# Patient Record
Sex: MALE | Race: WHITE | NOT HISPANIC OR LATINO | Employment: FULL TIME | URBAN - METROPOLITAN AREA
[De-identification: names, ages, dates, MRNs, and addresses within clinical notes are randomized per-mention and may not be internally consistent; named-entity substitution may affect disease eponyms.]

---

## 2020-01-04 ENCOUNTER — ANESTHESIA (OUTPATIENT)
Dept: ENDOSCOPY | Facility: HOSPITAL | Age: 50
DRG: 378 | End: 2020-01-04

## 2020-01-04 ENCOUNTER — HOSPITAL ENCOUNTER (INPATIENT)
Facility: HOSPITAL | Age: 50
LOS: 2 days | Discharge: LEFT BEFORE DISCHARGE | DRG: 378 | End: 2020-01-06
Attending: HOSPITALIST | Admitting: HOSPITALIST

## 2020-01-04 ENCOUNTER — ANESTHESIA EVENT (OUTPATIENT)
Dept: ENDOSCOPY | Facility: HOSPITAL | Age: 50
DRG: 378 | End: 2020-01-04

## 2020-01-04 ENCOUNTER — HOSPITAL ENCOUNTER (EMERGENCY)
Facility: HOSPITAL | Age: 50
Discharge: ANOTHER HOSPITAL | End: 2020-01-04
Attending: FAMILY MEDICINE

## 2020-01-04 VITALS
TEMPERATURE: 98 F | SYSTOLIC BLOOD PRESSURE: 136 MMHG | HEART RATE: 61 BPM | HEIGHT: 72 IN | RESPIRATION RATE: 12 BRPM | DIASTOLIC BLOOD PRESSURE: 78 MMHG | WEIGHT: 205 LBS | OXYGEN SATURATION: 99 % | BODY MASS INDEX: 27.77 KG/M2

## 2020-01-04 DIAGNOSIS — K74.60 HEPATIC CIRRHOSIS, UNSPECIFIED HEPATIC CIRRHOSIS TYPE, UNSPECIFIED WHETHER ASCITES PRESENT: ICD-10-CM

## 2020-01-04 DIAGNOSIS — K92.2 UPPER GI BLEED: Primary | ICD-10-CM

## 2020-01-04 DIAGNOSIS — K74.60 CIRRHOSIS OF LIVER WITH ASCITES, UNSPECIFIED HEPATIC CIRRHOSIS TYPE: ICD-10-CM

## 2020-01-04 DIAGNOSIS — K92.0 HEMATEMESIS WITH NAUSEA: Primary | ICD-10-CM

## 2020-01-04 DIAGNOSIS — R18.8 CIRRHOSIS OF LIVER WITH ASCITES, UNSPECIFIED HEPATIC CIRRHOSIS TYPE: ICD-10-CM

## 2020-01-04 DIAGNOSIS — K92.2 GIB (GASTROINTESTINAL BLEEDING): ICD-10-CM

## 2020-01-04 DIAGNOSIS — K92.1 MELENA: ICD-10-CM

## 2020-01-04 DIAGNOSIS — K70.31 ASCITES DUE TO ALCOHOLIC CIRRHOSIS: ICD-10-CM

## 2020-01-04 PROBLEM — I10 ESSENTIAL HYPERTENSION: Status: ACTIVE | Noted: 2020-01-04

## 2020-01-04 PROBLEM — K40.90 NON-RECURRENT UNILATERAL INGUINAL HERNIA WITHOUT OBSTRUCTION OR GANGRENE: Status: ACTIVE | Noted: 2020-01-04

## 2020-01-04 LAB
ABO + RH BLD: NORMAL
ABO + RH BLD: NORMAL
ALBUMIN SERPL BCP-MCNC: 2.7 G/DL (ref 3.5–5.2)
ALP SERPL-CCNC: 115 U/L (ref 55–135)
ALT SERPL W/O P-5'-P-CCNC: 14 U/L (ref 10–44)
AMMONIA PLAS-SCNC: 48 UMOL/L (ref 10–50)
ANION GAP SERPL CALC-SCNC: 8 MMOL/L (ref 8–16)
AST SERPL-CCNC: 26 U/L (ref 10–40)
BASOPHILS # BLD AUTO: 0.05 K/UL (ref 0–0.2)
BASOPHILS # BLD AUTO: 0.06 K/UL (ref 0–0.2)
BASOPHILS # BLD AUTO: 0.06 K/UL (ref 0–0.2)
BASOPHILS # BLD AUTO: 0.07 K/UL (ref 0–0.2)
BASOPHILS NFR BLD: 0.9 % (ref 0–1.9)
BASOPHILS NFR BLD: 1 % (ref 0–1.9)
BASOPHILS NFR BLD: 1.1 % (ref 0–1.9)
BASOPHILS NFR BLD: 1.1 % (ref 0–1.9)
BILIRUB SERPL-MCNC: 0.9 MG/DL (ref 0.1–1)
BLD GP AB SCN CELLS X3 SERPL QL: NORMAL
BLD GP AB SCN CELLS X3 SERPL QL: NORMAL
BLD PROD TYP BPU: NORMAL
BLD PROD TYP BPU: NORMAL
BLOOD UNIT EXPIRATION DATE: NORMAL
BLOOD UNIT EXPIRATION DATE: NORMAL
BLOOD UNIT TYPE CODE: 6200
BLOOD UNIT TYPE CODE: 6200
BLOOD UNIT TYPE: NORMAL
BLOOD UNIT TYPE: NORMAL
BUN SERPL-MCNC: 39 MG/DL (ref 6–20)
CALCIUM SERPL-MCNC: 8.6 MG/DL (ref 8.7–10.5)
CHLORIDE SERPL-SCNC: 112 MMOL/L (ref 95–110)
CO2 SERPL-SCNC: 18 MMOL/L (ref 23–29)
CODING SYSTEM: NORMAL
CODING SYSTEM: NORMAL
CREAT SERPL-MCNC: 2.4 MG/DL (ref 0.5–1.4)
DIFFERENTIAL METHOD: ABNORMAL
DISPENSE STATUS: NORMAL
DISPENSE STATUS: NORMAL
EOSINOPHIL # BLD AUTO: 0.2 K/UL (ref 0–0.5)
EOSINOPHIL # BLD AUTO: 0.3 K/UL (ref 0–0.5)
EOSINOPHIL # BLD AUTO: 0.4 K/UL (ref 0–0.5)
EOSINOPHIL # BLD AUTO: 0.6 K/UL (ref 0–0.5)
EOSINOPHIL NFR BLD: 2.8 % (ref 0–8)
EOSINOPHIL NFR BLD: 4.6 % (ref 0–8)
EOSINOPHIL NFR BLD: 6.6 % (ref 0–8)
EOSINOPHIL NFR BLD: 9.1 % (ref 0–8)
ERYTHROCYTE [DISTWIDTH] IN BLOOD BY AUTOMATED COUNT: 14 % (ref 11.5–14.5)
ERYTHROCYTE [DISTWIDTH] IN BLOOD BY AUTOMATED COUNT: 14.3 % (ref 11.5–14.5)
ERYTHROCYTE [DISTWIDTH] IN BLOOD BY AUTOMATED COUNT: 15 % (ref 11.5–14.5)
ERYTHROCYTE [DISTWIDTH] IN BLOOD BY AUTOMATED COUNT: 15.2 % (ref 11.5–14.5)
EST. GFR  (AFRICAN AMERICAN): 35.3 ML/MIN/1.73 M^2
EST. GFR  (NON AFRICAN AMERICAN): 30.5 ML/MIN/1.73 M^2
ETHANOL SERPL-MCNC: 9 MG/DL
FERRITIN SERPL-MCNC: 97 NG/ML (ref 20–300)
GLUCOSE SERPL-MCNC: 115 MG/DL (ref 70–110)
HCT VFR BLD AUTO: 21.1 % (ref 40–54)
HCT VFR BLD AUTO: 22.9 % (ref 40–54)
HCT VFR BLD AUTO: 24.7 % (ref 40–54)
HCT VFR BLD AUTO: 25.2 % (ref 40–54)
HGB BLD-MCNC: 7.1 G/DL (ref 14–18)
HGB BLD-MCNC: 7.8 G/DL (ref 14–18)
HGB BLD-MCNC: 8.2 G/DL (ref 14–18)
HGB BLD-MCNC: 8.5 G/DL (ref 14–18)
IMM GRANULOCYTES # BLD AUTO: 0.01 K/UL (ref 0–0.04)
IMM GRANULOCYTES # BLD AUTO: 0.02 K/UL (ref 0–0.04)
IMM GRANULOCYTES NFR BLD AUTO: 0.2 % (ref 0–0.5)
IMM GRANULOCYTES NFR BLD AUTO: 0.4 % (ref 0–0.5)
INR PPP: 1.2 (ref 0.8–1.2)
IRON SERPL-MCNC: 119 UG/DL (ref 45–160)
LIPASE SERPL-CCNC: 53 U/L (ref 4–60)
LYMPHOCYTES # BLD AUTO: 0.9 K/UL (ref 1–4.8)
LYMPHOCYTES # BLD AUTO: 1.3 K/UL (ref 1–4.8)
LYMPHOCYTES # BLD AUTO: 1.4 K/UL (ref 1–4.8)
LYMPHOCYTES # BLD AUTO: 1.6 K/UL (ref 1–4.8)
LYMPHOCYTES NFR BLD: 15.9 % (ref 18–48)
LYMPHOCYTES NFR BLD: 20.4 % (ref 18–48)
LYMPHOCYTES NFR BLD: 24.2 % (ref 18–48)
LYMPHOCYTES NFR BLD: 26.5 % (ref 18–48)
MCH RBC QN AUTO: 32.7 PG (ref 27–31)
MCH RBC QN AUTO: 33.2 PG (ref 27–31)
MCH RBC QN AUTO: 33.6 PG (ref 27–31)
MCH RBC QN AUTO: 33.6 PG (ref 27–31)
MCHC RBC AUTO-ENTMCNC: 33.2 G/DL (ref 32–36)
MCHC RBC AUTO-ENTMCNC: 33.6 G/DL (ref 32–36)
MCHC RBC AUTO-ENTMCNC: 33.7 G/DL (ref 32–36)
MCHC RBC AUTO-ENTMCNC: 34.1 G/DL (ref 32–36)
MCV RBC AUTO: 100 FL (ref 82–98)
MCV RBC AUTO: 98 FL (ref 82–98)
MCV RBC AUTO: 98 FL (ref 82–98)
MCV RBC AUTO: 99 FL (ref 82–98)
MONOCYTES # BLD AUTO: 0.3 K/UL (ref 0.3–1)
MONOCYTES # BLD AUTO: 0.5 K/UL (ref 0.3–1)
MONOCYTES # BLD AUTO: 0.5 K/UL (ref 0.3–1)
MONOCYTES # BLD AUTO: 0.6 K/UL (ref 0.3–1)
MONOCYTES NFR BLD: 5.9 % (ref 4–15)
MONOCYTES NFR BLD: 7.3 % (ref 4–15)
MONOCYTES NFR BLD: 8.6 % (ref 4–15)
MONOCYTES NFR BLD: 9.8 % (ref 4–15)
NEUTROPHILS # BLD AUTO: 3.4 K/UL (ref 1.8–7.7)
NEUTROPHILS # BLD AUTO: 3.5 K/UL (ref 1.8–7.7)
NEUTROPHILS # BLD AUTO: 3.9 K/UL (ref 1.8–7.7)
NEUTROPHILS # BLD AUTO: 4 K/UL (ref 1.8–7.7)
NEUTROPHILS NFR BLD: 55.9 % (ref 38–73)
NEUTROPHILS NFR BLD: 61.5 % (ref 38–73)
NEUTROPHILS NFR BLD: 61.9 % (ref 38–73)
NEUTROPHILS NFR BLD: 73.9 % (ref 38–73)
NRBC BLD-RTO: 0 /100 WBC
NUM UNITS TRANS PACKED RBC: NORMAL
NUM UNITS TRANS PACKED RBC: NORMAL
OB PNL STL: POSITIVE
PLATELET # BLD AUTO: 143 K/UL (ref 150–350)
PLATELET # BLD AUTO: 145 K/UL (ref 150–350)
PLATELET # BLD AUTO: 146 K/UL (ref 150–350)
PLATELET # BLD AUTO: 169 K/UL (ref 150–350)
PMV BLD AUTO: 9.1 FL (ref 9.2–12.9)
PMV BLD AUTO: 9.3 FL (ref 9.2–12.9)
PMV BLD AUTO: 9.4 FL (ref 9.2–12.9)
PMV BLD AUTO: 9.4 FL (ref 9.2–12.9)
POTASSIUM SERPL-SCNC: 4.4 MMOL/L (ref 3.5–5.1)
PROT SERPL-MCNC: 6.5 G/DL (ref 6–8.4)
PROTHROMBIN TIME: 13.3 SEC (ref 9–12.5)
RBC # BLD AUTO: 2.11 M/UL (ref 4.6–6.2)
RBC # BLD AUTO: 2.32 M/UL (ref 4.6–6.2)
RBC # BLD AUTO: 2.51 M/UL (ref 4.6–6.2)
RBC # BLD AUTO: 2.56 M/UL (ref 4.6–6.2)
SATURATED IRON: 49 % (ref 20–50)
SODIUM SERPL-SCNC: 138 MMOL/L (ref 136–145)
TOTAL IRON BINDING CAPACITY: 244 UG/DL (ref 250–450)
TRANSFERRIN SERPL-MCNC: 165 MG/DL (ref 200–375)
WBC # BLD AUTO: 5.41 K/UL (ref 3.9–12.7)
WBC # BLD AUTO: 5.61 K/UL (ref 3.9–12.7)
WBC # BLD AUTO: 6.03 K/UL (ref 3.9–12.7)
WBC # BLD AUTO: 6.26 K/UL (ref 3.9–12.7)

## 2020-01-04 PROCEDURE — 63600175 PHARM REV CODE 636 W HCPCS: Performed by: INTERNAL MEDICINE

## 2020-01-04 PROCEDURE — 36430 TRANSFUSION BLD/BLD COMPNT: CPT

## 2020-01-04 PROCEDURE — 82140 ASSAY OF AMMONIA: CPT

## 2020-01-04 PROCEDURE — 83540 ASSAY OF IRON: CPT

## 2020-01-04 PROCEDURE — 63600175 PHARM REV CODE 636 W HCPCS: Performed by: NURSE PRACTITIONER

## 2020-01-04 PROCEDURE — C9113 INJ PANTOPRAZOLE SODIUM, VIA: HCPCS | Performed by: FAMILY MEDICINE

## 2020-01-04 PROCEDURE — 43255 EGD CONTROL BLEEDING ANY: CPT | Mod: ,,, | Performed by: INTERNAL MEDICINE

## 2020-01-04 PROCEDURE — 99254 PR INITIAL INPATIENT CONSULT,LEVL IV: ICD-10-PCS | Mod: 25,,, | Performed by: INTERNAL MEDICINE

## 2020-01-04 PROCEDURE — 86920 COMPATIBILITY TEST SPIN: CPT

## 2020-01-04 PROCEDURE — 11000001 HC ACUTE MED/SURG PRIVATE ROOM

## 2020-01-04 PROCEDURE — 70450 CT HEAD WITHOUT CONTRAST: ICD-10-PCS | Mod: 26,,, | Performed by: RADIOLOGY

## 2020-01-04 PROCEDURE — 85025 COMPLETE CBC W/AUTO DIFF WBC: CPT | Mod: 91

## 2020-01-04 PROCEDURE — 96374 THER/PROPH/DIAG INJ IV PUSH: CPT

## 2020-01-04 PROCEDURE — 63600175 PHARM REV CODE 636 W HCPCS: Performed by: NURSE ANESTHETIST, CERTIFIED REGISTERED

## 2020-01-04 PROCEDURE — 70450 CT HEAD/BRAIN W/O DYE: CPT | Mod: 26,,, | Performed by: RADIOLOGY

## 2020-01-04 PROCEDURE — 96375 TX/PRO/DX INJ NEW DRUG ADDON: CPT | Mod: 59

## 2020-01-04 PROCEDURE — 99254 IP/OBS CNSLTJ NEW/EST MOD 60: CPT | Mod: 25,,, | Performed by: INTERNAL MEDICINE

## 2020-01-04 PROCEDURE — 86850 RBC ANTIBODY SCREEN: CPT | Mod: 91

## 2020-01-04 PROCEDURE — 37000008 HC ANESTHESIA 1ST 15 MINUTES: Performed by: INTERNAL MEDICINE

## 2020-01-04 PROCEDURE — 82728 ASSAY OF FERRITIN: CPT

## 2020-01-04 PROCEDURE — 82105 ALPHA-FETOPROTEIN SERUM: CPT

## 2020-01-04 PROCEDURE — 27201038 HC PROBE, BI-POLAR: Performed by: INTERNAL MEDICINE

## 2020-01-04 PROCEDURE — D9220A PRA ANESTHESIA: Mod: ,,, | Performed by: ANESTHESIOLOGY

## 2020-01-04 PROCEDURE — 96361 HYDRATE IV INFUSION ADD-ON: CPT

## 2020-01-04 PROCEDURE — 80053 COMPREHEN METABOLIC PANEL: CPT

## 2020-01-04 PROCEDURE — 70450 CT HEAD/BRAIN W/O DYE: CPT | Mod: TC

## 2020-01-04 PROCEDURE — 36415 COLL VENOUS BLD VENIPUNCTURE: CPT

## 2020-01-04 PROCEDURE — C9113 INJ PANTOPRAZOLE SODIUM, VIA: HCPCS | Performed by: NURSE PRACTITIONER

## 2020-01-04 PROCEDURE — 25000003 PHARM REV CODE 250: Performed by: NURSE PRACTITIONER

## 2020-01-04 PROCEDURE — D9220A PRA ANESTHESIA: ICD-10-PCS | Mod: ,,, | Performed by: ANESTHESIOLOGY

## 2020-01-04 PROCEDURE — 99285 EMERGENCY DEPT VISIT HI MDM: CPT | Mod: 25

## 2020-01-04 PROCEDURE — 43236 UPPR GI SCOPE W/SUBMUC INJ: CPT | Performed by: INTERNAL MEDICINE

## 2020-01-04 PROCEDURE — 80320 DRUG SCREEN QUANTALCOHOLS: CPT

## 2020-01-04 PROCEDURE — 43255 EGD CONTROL BLEEDING ANY: CPT | Performed by: INTERNAL MEDICINE

## 2020-01-04 PROCEDURE — 37000009 HC ANESTHESIA EA ADD 15 MINS: Performed by: INTERNAL MEDICINE

## 2020-01-04 PROCEDURE — 82272 OCCULT BLD FECES 1-3 TESTS: CPT

## 2020-01-04 PROCEDURE — 83690 ASSAY OF LIPASE: CPT

## 2020-01-04 PROCEDURE — 86850 RBC ANTIBODY SCREEN: CPT

## 2020-01-04 PROCEDURE — P9016 RBC LEUKOCYTES REDUCED: HCPCS

## 2020-01-04 PROCEDURE — 43255 PR EGD, FLEX, W/CTRL BLEED, ANY METHOD: ICD-10-PCS | Mod: ,,, | Performed by: INTERNAL MEDICINE

## 2020-01-04 PROCEDURE — 63600175 PHARM REV CODE 636 W HCPCS: Performed by: FAMILY MEDICINE

## 2020-01-04 PROCEDURE — 25000003 PHARM REV CODE 250: Performed by: INTERNAL MEDICINE

## 2020-01-04 PROCEDURE — 85610 PROTHROMBIN TIME: CPT

## 2020-01-04 RX ORDER — PROPOFOL 10 MG/ML
VIAL (ML) INTRAVENOUS
Status: DISCONTINUED | OUTPATIENT
Start: 2020-01-04 | End: 2020-01-04

## 2020-01-04 RX ORDER — LIDOCAINE HCL/PF 100 MG/5ML
SYRINGE (ML) INTRAVENOUS
Status: DISCONTINUED | OUTPATIENT
Start: 2020-01-04 | End: 2020-01-04

## 2020-01-04 RX ORDER — ONDANSETRON 2 MG/ML
4 INJECTION INTRAMUSCULAR; INTRAVENOUS
Status: COMPLETED | OUTPATIENT
Start: 2020-01-04 | End: 2020-01-04

## 2020-01-04 RX ORDER — SODIUM CHLORIDE 9 MG/ML
INJECTION, SOLUTION INTRAVENOUS CONTINUOUS
Status: DISCONTINUED | OUTPATIENT
Start: 2020-01-04 | End: 2020-01-04

## 2020-01-04 RX ORDER — PANTOPRAZOLE SODIUM 40 MG/10ML
40 INJECTION, POWDER, LYOPHILIZED, FOR SOLUTION INTRAVENOUS
Status: COMPLETED | OUTPATIENT
Start: 2020-01-04 | End: 2020-01-04

## 2020-01-04 RX ORDER — HYDROCODONE BITARTRATE AND ACETAMINOPHEN 500; 5 MG/1; MG/1
TABLET ORAL
Status: DISCONTINUED | OUTPATIENT
Start: 2020-01-04 | End: 2020-01-04 | Stop reason: HOSPADM

## 2020-01-04 RX ORDER — HYDRALAZINE HYDROCHLORIDE 20 MG/ML
20 INJECTION INTRAMUSCULAR; INTRAVENOUS EVERY 6 HOURS PRN
Status: DISCONTINUED | OUTPATIENT
Start: 2020-01-04 | End: 2020-01-06 | Stop reason: HOSPADM

## 2020-01-04 RX ORDER — SODIUM CHLORIDE 9 MG/ML
1000 INJECTION, SOLUTION INTRAVENOUS
Status: COMPLETED | OUTPATIENT
Start: 2020-01-04 | End: 2020-01-04

## 2020-01-04 RX ORDER — MORPHINE SULFATE 4 MG/ML
2 INJECTION, SOLUTION INTRAMUSCULAR; INTRAVENOUS
Status: COMPLETED | OUTPATIENT
Start: 2020-01-04 | End: 2020-01-04

## 2020-01-04 RX ORDER — SUCRALFATE 1 G/10ML
1 SUSPENSION ORAL EVERY 6 HOURS
Status: DISCONTINUED | OUTPATIENT
Start: 2020-01-04 | End: 2020-01-06 | Stop reason: HOSPADM

## 2020-01-04 RX ORDER — SODIUM CHLORIDE 9 MG/ML
INJECTION, SOLUTION INTRAVENOUS CONTINUOUS
Status: ACTIVE | OUTPATIENT
Start: 2020-01-04 | End: 2020-01-05

## 2020-01-04 RX ADMIN — PROPOFOL 50 MG: 10 INJECTION, EMULSION INTRAVENOUS at 11:01

## 2020-01-04 RX ADMIN — SODIUM CHLORIDE 1000 ML: 0.9 INJECTION, SOLUTION INTRAVENOUS at 01:01

## 2020-01-04 RX ADMIN — SODIUM CHLORIDE 1000 ML: 0.9 INJECTION, SOLUTION INTRAVENOUS at 11:01

## 2020-01-04 RX ADMIN — PANTOPRAZOLE SODIUM 40 MG: 40 INJECTION, POWDER, LYOPHILIZED, FOR SOLUTION INTRAVENOUS at 01:01

## 2020-01-04 RX ADMIN — DEXTROSE 8 MG/HR: 50 INJECTION, SOLUTION INTRAVENOUS at 04:01

## 2020-01-04 RX ADMIN — CEFTRIAXONE 1 G: 1 INJECTION, SOLUTION INTRAVENOUS at 12:01

## 2020-01-04 RX ADMIN — ONDANSETRON 4 MG: 2 INJECTION INTRAMUSCULAR; INTRAVENOUS at 03:01

## 2020-01-04 RX ADMIN — DEXTROSE 8 MG/HR: 50 INJECTION, SOLUTION INTRAVENOUS at 02:01

## 2020-01-04 RX ADMIN — PROPOFOL 100 MG: 10 INJECTION, EMULSION INTRAVENOUS at 11:01

## 2020-01-04 RX ADMIN — SODIUM CHLORIDE 1000 ML: 0.9 INJECTION, SOLUTION INTRAVENOUS at 12:01

## 2020-01-04 RX ADMIN — DEXTROSE 8 MG/HR: 50 INJECTION, SOLUTION INTRAVENOUS at 08:01

## 2020-01-04 RX ADMIN — SUCRALFATE 1 G: 1 SUSPENSION ORAL at 05:01

## 2020-01-04 RX ADMIN — DEXTROSE 8 MG/HR: 50 INJECTION, SOLUTION INTRAVENOUS at 12:01

## 2020-01-04 RX ADMIN — LIDOCAINE HYDROCHLORIDE 100 MG: 20 INJECTION, SOLUTION INTRAVENOUS at 11:01

## 2020-01-04 RX ADMIN — MORPHINE SULFATE 2 MG: 4 INJECTION, SOLUTION INTRAMUSCULAR; INTRAVENOUS at 03:01

## 2020-01-04 RX ADMIN — FOLIC ACID: 5 INJECTION, SOLUTION INTRAMUSCULAR; INTRAVENOUS; SUBCUTANEOUS at 01:01

## 2020-01-04 RX ADMIN — SODIUM CHLORIDE: 0.9 INJECTION, SOLUTION INTRAVENOUS at 05:01

## 2020-01-04 NOTE — PROGRESS NOTES
EGD done.  Nonbleeding varices with no stigmata of bleeding noted.  Severe portal hypertensive gastropathy with likely chronic oozing noted, however he also has multiple duodenal and gastric ulcers.  One of the duodenal ulcers was oozing and required endoscopic therapy.  Recommend IV PPI and oral sucralfate liquid.  Recommend repeat EGD in 8 weeks to check healing and may consider initiation of therapeutic banding at that time.  Check ultrasound and AFP.  Advance diet.  Will follow.

## 2020-01-04 NOTE — ASSESSMENT & PLAN NOTE
In the setting of chronic alcohol use and NSAID use   Abdominal ultrasound   No signs of hepatic encephalopathy on exam. Not complaint with medications at home.   Abdomen has fluid but not tense. Will consider paracentesis if inhouse on Monday. Ultrasound shows moderate ascites so not sure if enough fluid to tap.

## 2020-01-04 NOTE — H&P
[facility] Medicine  History & Physical    Patient Name: Gumaro Caruso  MRN: [id number]  Admission Date: 1/4/2020  Attending Physician: Bentley Palma MD   Primary Care Provider: Primary Doctor No         Patient information was obtained from patient and review of medical records.     Subjective:     Principal Problem:<principal problem not specified>    Chief Complaint: No chief complaint on file.       HPI: 49 y.o. male with  a h/o liver cirrhosis. Chronic NSAID use, chronic heavy alchol use, quit last year as per him who presented to an OSH for  hematemesis, onset yesterday, occurring x 4-5 episodes, associated with abdominal pain, nausea and black stool over the same period, with no alleviating/exacerbating factors.    He states that he has [place] of [place] in the past and had an ulcer. He has been a heavy drinker for many years and states that he quit in October.  He has a known history of cirrhosis secondary to EtOH.   He also has been endorsing worsening swelling in his abdomen in the last week. Is suppose to be on medications , about 6 of them but does not take any of the medications he is prescribed due to insurance reasons.   Patient was transferred over for urgent evaluation of GI bleed and underwent an EGD which showed Nonbleeding varices with no stigmata of bleeding noted.  Severe portal hypertensive gastropathy with likely chronic oozing noted, however he also has multiple duodenal and gastric ulcers.  One of the duodenal ulcers was oozing and required endoscopic therapy.   Post procedure he was continued on IV PPI and admitted to the hosopitalist service     Past Medical History:   Diagnosis Date    Cirrhosis     Pancreatitis        Past Surgical History:   Procedure Laterality Date    ANKLE SURGERY      ELBOW SURGERY      FINGER SURGERY      HAND SURGERY      KNEE SURGERY      SHOULDER SURGERY         Review of patient's allergies indicates:  No Known  Allergies    Current Facility-Administered Medications on File Prior to Encounter   Medication    [COMPLETED] 0.9%  NaCl infusion    [COMPLETED] morphine injection 2 mg    [COMPLETED] ondansetron injection 4 mg    [COMPLETED] pantoprazole injection 40 mg    [DISCONTINUED] 0.9%  NaCl infusion (for blood administration)     No current outpatient medications on file prior to encounter.     Family History     None        Tobacco Use    Smoking status: Former Smoker   Substance and Sexual Activity    Alcohol use: Yes     Comment: daily    Drug use: Not Currently    Sexual activity: Not on file     Review of Systems   Constitutional: Positive for activity change. Negative for appetite change, chills, diaphoresis and fatigue.   HENT: Negative.    Eyes: Negative.    Respiratory: Negative.    Cardiovascular: Negative.    Gastrointestinal: Positive for abdominal distention, abdominal pain, blood in stool and nausea. Negative for constipation, diarrhea and rectal pain.        Hematemesis   Endocrine: Negative.    Genitourinary: Negative.    Musculoskeletal: Negative.    Skin: Negative.    Allergic/Immunologic: Negative.    Neurological: Negative.    Hematological: Negative.    Psychiatric/Behavioral: Negative.      Objective:     Vital Signs (Most Recent):  Temp: 98.4 °F (36.9 °C) (01/04/20 1452)  Pulse: 71 (01/04/20 1452)  Resp: 18 (01/04/20 1452)  BP: (!) 162/97 (01/04/20 1452)  SpO2: 98 % (01/04/20 1452) Vital Signs (24h Range):  Temp:  [97.6 °F (36.4 °C)-98.4 °F (36.9 °C)] 98.4 °F (36.9 °C)  Pulse:  [61-90] 71  Resp:  [11-20] 18  SpO2:  [94 %-100 %] 98 %  BP: (123-229)/() 162/97     Weight: 93 kg (205 lb)  Body mass index is 27.8 kg/m².    Physical Exam   Constitutional: He is oriented to person, place, and time. He appears well-developed.   HENT:   Head: Normocephalic.   Eyes: Pupils are equal, round, and reactive to light.   Neck: Normal range of motion.   Cardiovascular: Normal rate and regular rhythm.    Pulmonary/Chest: Effort normal and breath sounds normal.   Abdominal: He exhibits distension. He exhibits no mass. There is no tenderness. There is no rebound and no guarding. A hernia is present.   Musculoskeletal: Normal range of motion.   Neurological: He is alert and oriented to person, place, and time. He displays normal reflexes. No cranial nerve deficit. He exhibits normal muscle tone. Coordination normal.   Skin: Skin is warm and dry. Capillary refill takes less than 2 seconds.         CRANIAL NERVES     CN III, IV, VI   Pupils are equal, round, and reactive to light.       Significant Labs: All pertinent labs within the past 24 hours have been reviewed.    Significant Imaging: I have reviewed all pertinent imaging results/findings within the past 24 hours.    Assessment/Plan:     Essential hypertension  Prn hydralazine to maintain BP < 160/100      Alcoholic cirrhosis of liver with ascites  In the setting of chronic alcohol use and NSAID use   Abdominal ultrasound   No signs of hepatic encephalopathy on exam. Not complaint with medications at home.   Abdomen has fluid but not tense. Will consider paracentesis if inhouse on Monday. Ultrasound shows moderate ascites so not sure if enough fluid to tap.       Non-recurrent unilateral inguinal hernia without obstruction or gangrene  Surgery consulted. Appreciate recs      GIB (gastrointestinal bleeding)  S/p urgent EGD which showed Nonbleeding varices with no stigmata of bleeding noted.  Severe portal hypertensive gastropathy with likely chronic oozing noted, however he also has multiple duodenal and gastric ulcers.  One of the duodenal ulcers was oozing and required endoscopic therapy.      GI following patient. Appreciate recs  Continue protonix drip  Sucralfate added per recs.   H&H monitoring with transfusion as needed   Advance diet  2 large bore IV access   Abdominal ultrasound and AFP per GI      VTE Risk Mitigation (From admission, onward)          Ordered     IP VTE LOW RISK PATIENT  Once      01/04/20 1016     Place sequential compression device  Until discontinued      01/04/20 1016                   Bentley Palma MD  Department of Hospital Medicine   [facility]

## 2020-01-04 NOTE — HPI
Patient is a 49-year-old male who was at work on Tuesday 12/31 and kicked a piece of wood. He felt a pop in the right groin.  He finished working but later that night when he was in the shower he noticed a lump in the right groin.  Over the next several days the lump became larger and larger and began to extend into his scrotum.  He complains of tenderness.  At the end of the workday it would be very swollen but by morning it would go down significantly.  He has been tolerating a regular diet.  He denies any abdominal pain.  Last night he had an episode of vomiting blood as well as a black melanotic stool and went to the emergency room. He was transferred here for EGD.  He underwent EGD this morning with endoscopic intervention of gastric and duodenal ulcers.

## 2020-01-04 NOTE — CONSULTS
[facility]  General Surgery  Consult Note    Patient Name: Gumaro Caruso  MRN: [id number]  Code Status: Full Code  Admission Date: 1/4/2020  Hospital Length of Stay: 0 days  Attending Physician: Bentley Palma MD  Primary Care Provider: Primary Doctor No    Patient information was obtained from patient and ER records.     Consults  Subjective:     Principal Problem: <principal problem not specified>    History of Present Illness: Patient is a 49-year-old male who was at work on Tuesday 12/31 and kicked a piece of wood. He felt a pop in the right groin.  He finished working but later that night when he was in the shower he noticed a lump in the right groin.  Over the next several days the lump became larger and larger and began to extend into his scrotum.  He complains of tenderness.  At the end of the workday it would be very swollen but by morning it would go down significantly.  He has been tolerating a regular diet.  He denies any abdominal pain.  Last night he had an episode of vomiting blood as well as a black melanotic stool and went to the emergency room. He was transferred here for EGD.  He underwent EGD this morning with endoscopic intervention of gastric and duodenal ulcers.    Current Facility-Administered Medications on File Prior to Encounter   Medication    [COMPLETED] 0.9%  NaCl infusion    [COMPLETED] morphine injection 2 mg    [COMPLETED] ondansetron injection 4 mg    [COMPLETED] pantoprazole injection 40 mg    [DISCONTINUED] 0.9%  NaCl infusion (for blood administration)     No current outpatient medications on file prior to encounter.       Review of patient's allergies indicates:  No Known Allergies    Past Medical History:   Diagnosis Date    Cirrhosis     Pancreatitis      Past Surgical History:   Procedure Laterality Date    ANKLE SURGERY      ELBOW SURGERY      FINGER SURGERY      HAND SURGERY      KNEE SURGERY      SHOULDER SURGERY       Family History      None        Tobacco Use    Smoking status: Former Smoker   Substance and Sexual Activity    Alcohol use: Yes     Comment: daily    Drug use: Not Currently    Sexual activity: Not on file     Review of Systems   Constitutional: Negative for appetite change, chills, fever and unexpected weight change.   HENT: Negative for hearing loss, rhinorrhea, sore throat and voice change.    Eyes: Negative for photophobia and visual disturbance.   Respiratory: Negative for cough, choking and shortness of breath.    Cardiovascular: Negative for chest pain, palpitations and leg swelling.   Gastrointestinal: Positive for blood in stool (melena) and vomiting (blood). Negative for abdominal pain, constipation, diarrhea and nausea.   Endocrine: Negative for cold intolerance, heat intolerance, polydipsia and polyuria.   Genitourinary: Positive for scrotal swelling and testicular pain.   Musculoskeletal: Negative for arthralgias, back pain, joint swelling and neck stiffness.   Skin: Negative for color change, pallor and rash.   Neurological: Negative for dizziness, seizures, syncope and headaches.   Hematological: Negative for adenopathy. Does not bruise/bleed easily.   Psychiatric/Behavioral: Negative for agitation, behavioral problems and confusion.     Objective:     Vital Signs (Most Recent):  Temp: 98.4 °F (36.9 °C) (01/04/20 1452)  Pulse: 71 (01/04/20 1452)  Resp: 18 (01/04/20 1452)  BP: (!) 162/97 (01/04/20 1452)  SpO2: 98 % (01/04/20 1452) Vital Signs (24h Range):  Temp:  [97.6 °F (36.4 °C)-98.4 °F (36.9 °C)] 98.4 °F (36.9 °C)  Pulse:  [61-90] 71  Resp:  [11-20] 18  SpO2:  [94 %-100 %] 98 %  BP: (123-229)/() 162/97     Weight: 93 kg (205 lb)  Body mass index is 27.8 kg/m².    Physical Exam   Constitutional: He appears well-developed and well-nourished.  Non-toxic appearance. No distress.   HENT:   Head: Normocephalic and atraumatic. Head is without abrasion and without laceration.   Right Ear: External ear normal.    Left Ear: External ear normal.   Nose: Nose normal.   Mouth/Throat: Oropharynx is clear and moist.   Eyes: Pupils are equal, round, and reactive to light. EOM are normal.   Neck: Trachea normal. No tracheal deviation and normal range of motion present. No thyroid mass and no thyromegaly present.   Cardiovascular: Normal rate and regular rhythm.   Pulmonary/Chest: Effort normal. No accessory muscle usage. No tachypnea. No respiratory distress.   Abdominal: Soft. Normal appearance and bowel sounds are normal. He exhibits no distension and no mass. There is no hepatosplenomegaly. There is no tenderness. There is no tenderness at McBurney's point and negative Baumann's sign. A hernia is present. Hernia confirmed positive in the right inguinal area.   Genitourinary:   Genitourinary Comments: Very large but completely soft and reducible RIH extending into scrotum. No skin changes.   Lymphadenopathy:     He has no cervical adenopathy.     He has no axillary adenopathy.        Right: No inguinal adenopathy present.        Left: No inguinal adenopathy present.   Neurological: He is alert. Coordination and gait normal.   Skin: Skin is warm and intact.   Psychiatric: He has a normal mood and affect. His speech is normal and behavior is normal.       Significant Labs:  CBC:   Recent Labs   Lab 01/04/20  1420   WBC 5.61   RBC 2.56*   HGB 8.5*   HCT 25.2*   *   MCV 98   MCH 33.2*   MCHC 33.7     BMP:   Recent Labs   Lab 01/04/20  0137   *      K 4.4   *   CO2 18*   BUN 39*   CREATININE 2.4*   CALCIUM 8.6*         Assessment/Plan:     Non-recurrent unilateral inguinal hernia without obstruction or gangrene  No evidence of incarceration or strangulation  No need for emergent surgery  Recommend OP surgical eval to have repair ASAP - should be covered by workers comp    GIB (gastrointestinal bleeding)  S/p EGD  Cont medical mgmt      VTE Risk Mitigation (From admission, onward)         Ordered     IP VTE  LOW RISK PATIENT  Once      01/04/20 1016     Place sequential compression device  Until discontinued      01/04/20 1016                Thank you for your consult. I will sign off. Please contact us if you have any additional questions.    Elsa Weston MD  General Surgery  [facility]

## 2020-01-04 NOTE — [employer] PATIENT INSTRUCTIONS
Discharge Summary/Instructions after an Endoscopic Procedure  Patient Name: Gumaro Caruso  Patient MRN: [id number]  Patient YOB: 1970 Saturday, January 04, 2020  Gigi Lloyd MD  RESTRICTIONS:  During your procedure today, you received medications for sedation.  These   medications may affect your judgment, balance and coordination.  Therefore,   for 24 hours, you have the following restrictions:   - DO NOT drive a car, operate machinery, make legal/financial decisions,   sign important papers or drink alcohol.    ACTIVITY:  Today: no heavy lifting, straining or running due to procedural   sedation/anesthesia.  The following day: return to full activity including work.  DIET:  Eat and drink normally unless instructed otherwise.     TREATMENT FOR COMMON SIDE EFFECTS:  - Mild abdominal pain, nausea, belching, bloating or excessive gas:  rest,   eat lightly and use a heating pad.  - Sore Throat: treat with throat lozenges and/or gargle with warm salt   water.  - Because air was used during the procedure, expelling large amounts of air   from your rectum or belching is normal.  - If a bowel prep was taken, you may not have a bowel movement for 1-3 days.    This is normal.  SYMPTOMS TO WATCH FOR AND REPORT TO YOUR PHYSICIAN:  1. Abdominal pain or bloating, other than gas cramps.  2. Chest pain.  3. Back pain.  4. Signs of infection such as: chills or fever occurring within 24 hours   after the procedure.  5. Rectal bleeding, which would show as bright red, maroon, or black stools.   (A tablespoon of blood from the rectum is not serious, especially if   hemorrhoids are present.)  6. Vomiting.  7. Weakness or dizziness.  GO DIRECTLY TO THE NEAREST EMERGENCY ROOM IF YOU HAVE ANY OF THE FOLLOWING:      Difficulty breathing              Chills and/or fever over 101 F   Persistent vomiting and/or vomiting blood   Severe abdominal pain   Severe chest pain   Black, tarry stools   Bleeding- more than one  tablespoon   Any other symptom or condition that you feel may need urgent attention  Your doctor recommends these additional instructions:  If any biopsies were taken, your doctors clinic will contact you in 1 to 2   weeks with any results.  - Return patient to hospital [facility] for ongoing care.   - Advance diet as tolerated and clear liquid diet.   - Continue present medications.   - No aspirin, ibuprofen, naproxen, or other non-steroidal anti-inflammatory   drugs.   - Repeat upper endoscopy in 8 weeks to check healing.   - Follow an antireflux regimen.   - Give Protonix (pantoprazole): initiate therapy with 80 mg IV bolus, then 8   mg/hr IV by continuous infusion for 3 days.   - Use sucralfate suspension 1 gram PO QID for 10 days.  For questions, problems or results please call your physician - Gigi Lloyd MD at Work:  [phone].  [facility], EMERGENCY ROOM PHONE NUMBER: [phone]  IF A COMPLICATION OR EMERGENCY SITUATION ARISES AND YOU ARE UNABLE TO REACH   YOUR PHYSICIAN - GO DIRECTLY TO THE EMERGENCY ROOM.  Gigi Lloyd MD  1/4/2020 11:42:43 AM  This report has been verified and signed electronically.  [employer]

## 2020-01-04 NOTE — PLAN OF CARE
POC reviewed with patient. Patient verbalizes understanding. Safety and tele maintained throughout shift. Free from falls. No complaints of pain. Call light within reach.

## 2020-01-04 NOTE — SUBJECTIVE & OBJECTIVE
Past Medical History:   Diagnosis Date    Cirrhosis     Pancreatitis        Past Surgical History:   Procedure Laterality Date    ANKLE SURGERY      ELBOW SURGERY      FINGER SURGERY      HAND SURGERY      KNEE SURGERY      SHOULDER SURGERY         Review of patient's allergies indicates:  No Known Allergies    Current Facility-Administered Medications on File Prior to Encounter   Medication    [COMPLETED] 0.9%  NaCl infusion    [COMPLETED] morphine injection 2 mg    [COMPLETED] ondansetron injection 4 mg    [COMPLETED] pantoprazole injection 40 mg    [DISCONTINUED] 0.9%  NaCl infusion (for blood administration)     No current outpatient medications on file prior to encounter.     Family History     None        Tobacco Use    Smoking status: Former Smoker   Substance and Sexual Activity    Alcohol use: Yes     Comment: daily    Drug use: Not Currently    Sexual activity: Not on file     Review of Systems   Constitutional: Positive for activity change. Negative for appetite change, chills, diaphoresis and fatigue.   HENT: Negative.    Eyes: Negative.    Respiratory: Negative.    Cardiovascular: Negative.    Gastrointestinal: Positive for abdominal distention, abdominal pain, blood in stool and nausea. Negative for constipation, diarrhea and rectal pain.        Hematemesis   Endocrine: Negative.    Genitourinary: Negative.    Musculoskeletal: Negative.    Skin: Negative.    Allergic/Immunologic: Negative.    Neurological: Negative.    Hematological: Negative.    Psychiatric/Behavioral: Negative.      Objective:     Vital Signs (Most Recent):  Temp: 98.4 °F (36.9 °C) (01/04/20 1452)  Pulse: 71 (01/04/20 1452)  Resp: 18 (01/04/20 1452)  BP: (!) 162/97 (01/04/20 1452)  SpO2: 98 % (01/04/20 1452) Vital Signs (24h Range):  Temp:  [97.6 °F (36.4 °C)-98.4 °F (36.9 °C)] 98.4 °F (36.9 °C)  Pulse:  [61-90] 71  Resp:  [11-20] 18  SpO2:  [94 %-100 %] 98 %  BP: (123-229)/() 162/97     Weight: 93 kg (205  lb)  Body mass index is 27.8 kg/m².    Physical Exam   Constitutional: He is oriented to person, place, and time. He appears well-developed.   HENT:   Head: Normocephalic.   Eyes: Pupils are equal, round, and reactive to light.   Neck: Normal range of motion.   Cardiovascular: Normal rate and regular rhythm.   Pulmonary/Chest: Effort normal and breath sounds normal.   Abdominal: He exhibits distension. He exhibits no mass. There is no tenderness. There is no rebound and no guarding. A hernia is present.   Musculoskeletal: Normal range of motion.   Neurological: He is alert and oriented to person, place, and time. He displays normal reflexes. No cranial nerve deficit. He exhibits normal muscle tone. Coordination normal.   Skin: Skin is warm and dry. Capillary refill takes less than 2 seconds.         CRANIAL NERVES     CN III, IV, VI   Pupils are equal, round, and reactive to light.       Significant Labs: All pertinent labs within the past 24 hours have been reviewed.    Significant Imaging: I have reviewed all pertinent imaging results/findings within the past 24 hours.

## 2020-01-04 NOTE — ED NOTES
VERBAL CONSENT RECEIVED FROM PATIENT TO UPDATE BROTHER YANELI [phone] ON STATUS. AUGS HUANG RN

## 2020-01-04 NOTE — PLAN OF CARE
(Physician in Lead of Transfers)   Outside Transfer Acceptance Note / Regional Referral Center      Transferring Physician: Federico Carson MD    Accepting Physician: Jean-Claude Pineda MD    Date of Acceptance: 01/04/2020    Transferring Facility: [facility]     Reason for Transfer: GI bleed    Report from Transferring Physician/Hospital course:  Patient is a 49 y.o. male who has a past medical history of Cirrhosis and Pancreatitis presented with vomiting bright red blood. Patient has history of alcohol use and was following hepatologist in [place]. Rectal exam guaiac positive. Hemoglobin initially was 7.8 but decreased to 7.1 after IVF's. Patient is preparing to get 2 units of PRBC transfusion prior to transfer. Hemodynamically stable. No coagulopathy. Not on anti platelets or anticoagulation. Received CT head because patient complained of headache which did not show any acute intracranial abnormalities. Patient being transferred because facility does not have GI services.     Vital Signs (Most Recent):  Temp: 97.9 °F (36.6 °C) (01/04/20 0118)  Pulse: 76 (01/04/20 0347)  Resp: 14 (01/04/20 0347)  BP: (!) 174/102 (01/04/20 0347)  SpO2: 97 % (01/04/20 0347) Vital Signs (24h Range):  Temp:  [97.9 °F (36.6 °C)] 97.9 °F (36.6 °C)  Pulse:  [73-90] 76  Resp:  [12-18] 14  SpO2:  [94 %-99 %] 97 %  BP: (149-176)/() 174/102       Labs & Radiographs: see EPIC    Significant Labs:   CMP:   Recent Labs   Lab 01/04/20 0137      K 4.4   *   CO2 18*   *   BUN 39*   CREATININE 2.4*   CALCIUM 8.6*   PROT 6.5   ALBUMIN 2.7*   BILITOT 0.9   ALKPHOS 115   AST 26   ALT 14   ANIONGAP 8   ESTGFRAFRICA 35.3*   EGFRNONAA 30.5*   , CBC:   Recent Labs   Lab 01/04/20 0137 01/04/20 0426   WBC 6.26 5.41   HGB 7.8* 7.1*   HCT 22.9* 21.1*    146*   , INR:   Recent Labs   Lab 01/04/20  0137   INR 1.2    and Troponin No results for input(s): TROPONINI in the last 48 hours.    Significant Imaging:     CT  head: no acute abnormalities     To Do List:   1. Admit to   2. Protonix 80mg IV bolus x 1 then 8mg/hr gtt  3. Intravascular resuscitation/support with IVFs and pRBCs as needed.  4. Serial H/H's transfuse as needed.  5. Maintain IV access with 2 large bore IVs  6. Keep NPO   7. Consult GI for EGD.       Jean-Claude Pineda MD  [facility] Medicine Staff

## 2020-01-04 NOTE — ANESTHESIA PREPROCEDURE EVALUATION
01/04/2020  Gumaro Caruso is a 49 y.o., male.    Anesthesia Evaluation    I have reviewed the Patient Summary Reports.    I have reviewed the Nursing Notes.   I have reviewed the Medications.     Review of Systems  Hematology/Oncology:         -- Anemia: Hematology Comments: Hgb 7.1, 2 units PRBCs transfused   EENT/Dental:EENT/Dental Normal   Cardiovascular:  Cardiovascular Normal     Pulmonary:  Pulmonary Normal    Hepatic/GI:   Liver Disease, Cirrhosis, varices, abdominal pain   Musculoskeletal:  Musculoskeletal Normal    Neurological:  Neurology Normal    Endocrine:  Endocrine Normal        Physical Exam  General:  Well nourished    Airway/Jaw/Neck:  Airway Findings: Mouth Opening: Normal Tongue: Normal  General Airway Assessment: Adult  Mallampati: III  Improves to II with phonation.      Dental:  Dental Findings: In tact   Chest/Lungs:  Chest/Lungs Findings: Clear to auscultation, Normal Respiratory Rate         Mental Status:  Mental Status Findings:  Cooperative, Alert and Oriented         Anesthesia Plan  Type of Anesthesia, risks & benefits discussed:  Anesthesia Type:  general  Patient's Preference:   Intra-op Monitoring Plan: standard ASA monitors  Intra-op Monitoring Plan Comments:   Post Op Pain Control Plan: multimodal analgesia  Post Op Pain Control Plan Comments:   Induction:   IV  Beta Blocker:  Patient is not currently on a Beta-Blocker (No further documentation required).       Informed Consent: Patient understands risks and agrees with Anesthesia plan.  Questions answered. Anesthesia consent signed with patient.  ASA Score: 3  emergent   Day of Surgery Review of History & Physical:            Ready For Surgery From Anesthesia Perspective.

## 2020-01-04 NOTE — ASSESSMENT & PLAN NOTE
S/p urgent EGD which showed Nonbleeding varices with no stigmata of bleeding noted.  Severe portal hypertensive gastropathy with likely chronic oozing noted, however he also has multiple duodenal and gastric ulcers.  One of the duodenal ulcers was oozing and required endoscopic therapy.      GI following patient. Appreciate recs  Continue protonix drip  Sucralfate added per recs.   H&H monitoring with transfusion as needed   Advance diet  2 large bore IV access   Abdominal ultrasound and AFP per GI

## 2020-01-04 NOTE — ASSESSMENT & PLAN NOTE
No evidence of incarceration or strangulation  No need for emergent surgery  Recommend OP surgical eval to have repair ASAP - should be covered by workers comp

## 2020-01-04 NOTE — SUBJECTIVE & OBJECTIVE
Current Facility-Administered Medications on File Prior to Encounter   Medication    [COMPLETED] 0.9%  NaCl infusion    [COMPLETED] morphine injection 2 mg    [COMPLETED] ondansetron injection 4 mg    [COMPLETED] pantoprazole injection 40 mg    [DISCONTINUED] 0.9%  NaCl infusion (for blood administration)     No current outpatient medications on file prior to encounter.       Review of patient's allergies indicates:  No Known Allergies    Past Medical History:   Diagnosis Date    Cirrhosis     Pancreatitis      Past Surgical History:   Procedure Laterality Date    ANKLE SURGERY      ELBOW SURGERY      FINGER SURGERY      HAND SURGERY      KNEE SURGERY      SHOULDER SURGERY       Family History     None        Tobacco Use    Smoking status: Former Smoker   Substance and Sexual Activity    Alcohol use: Yes     Comment: daily    Drug use: Not Currently    Sexual activity: Not on file     Review of Systems   Constitutional: Negative for appetite change, chills, fever and unexpected weight change.   HENT: Negative for hearing loss, rhinorrhea, sore throat and voice change.    Eyes: Negative for photophobia and visual disturbance.   Respiratory: Negative for cough, choking and shortness of breath.    Cardiovascular: Negative for chest pain, palpitations and leg swelling.   Gastrointestinal: Positive for blood in stool (melena) and vomiting (blood). Negative for abdominal pain, constipation, diarrhea and nausea.   Endocrine: Negative for cold intolerance, heat intolerance, polydipsia and polyuria.   Genitourinary: Positive for scrotal swelling and testicular pain.   Musculoskeletal: Negative for arthralgias, back pain, joint swelling and neck stiffness.   Skin: Negative for color change, pallor and rash.   Neurological: Negative for dizziness, seizures, syncope and headaches.   Hematological: Negative for adenopathy. Does not bruise/bleed easily.   Psychiatric/Behavioral: Negative for agitation, behavioral  problems and confusion.     Objective:     Vital Signs (Most Recent):  Temp: 98.4 °F (36.9 °C) (01/04/20 1452)  Pulse: 71 (01/04/20 1452)  Resp: 18 (01/04/20 1452)  BP: (!) 162/97 (01/04/20 1452)  SpO2: 98 % (01/04/20 1452) Vital Signs (24h Range):  Temp:  [97.6 °F (36.4 °C)-98.4 °F (36.9 °C)] 98.4 °F (36.9 °C)  Pulse:  [61-90] 71  Resp:  [11-20] 18  SpO2:  [94 %-100 %] 98 %  BP: (123-229)/() 162/97     Weight: 93 kg (205 lb)  Body mass index is 27.8 kg/m².    Physical Exam   Constitutional: He appears well-developed and well-nourished.  Non-toxic appearance. No distress.   HENT:   Head: Normocephalic and atraumatic. Head is without abrasion and without laceration.   Right Ear: External ear normal.   Left Ear: External ear normal.   Nose: Nose normal.   Mouth/Throat: Oropharynx is clear and moist.   Eyes: Pupils are equal, round, and reactive to light. EOM are normal.   Neck: Trachea normal. No tracheal deviation and normal range of motion present. No thyroid mass and no thyromegaly present.   Cardiovascular: Normal rate and regular rhythm.   Pulmonary/Chest: Effort normal. No accessory muscle usage. No tachypnea. No respiratory distress.   Abdominal: Soft. Normal appearance and bowel sounds are normal. He exhibits no distension and no mass. There is no hepatosplenomegaly. There is no tenderness. There is no tenderness at McBurney's point and negative Baumann's sign. A hernia is present. Hernia confirmed positive in the right inguinal area.   Genitourinary:   Genitourinary Comments: Very large but completely soft and reducible RIH extending into scrotum. No skin changes.   Lymphadenopathy:     He has no cervical adenopathy.     He has no axillary adenopathy.        Right: No inguinal adenopathy present.        Left: No inguinal adenopathy present.   Neurological: He is alert. Coordination and gait normal.   Skin: Skin is warm and intact.   Psychiatric: He has a normal mood and affect. His speech is normal and  behavior is normal.       Significant Labs:  CBC:   Recent Labs   Lab 01/04/20  1420   WBC 5.61   RBC 2.56*   HGB 8.5*   HCT 25.2*   *   MCV 98   MCH 33.2*   MCHC 33.7     BMP:   Recent Labs   Lab 01/04/20  0137   *      K 4.4   *   CO2 18*   BUN 39*   CREATININE 2.4*   CALCIUM 8.6*

## 2020-01-04 NOTE — ED PROVIDER NOTES
Encounter Date: 1/4/2020       History     Chief Complaint   Patient presents with    Hematemesis     vomited blood x4-5     49-year-old male presents complaining of vomiting for 5 times last few times he did notice a blood in the same the sink was full of red blood according to the brother the patient moved to this area from the [place] area approximately 8 months ago and has a known history of cirrhosis I used to be a drinker every day but do not now patient has a history of hemorrhoids but is not known to have esophageal varices, patient has felt weak and has noticed dark black bowel movements over the past few hours        Review of patient's allergies indicates:  No Known Allergies  Past Medical History:   Diagnosis Date    Cirrhosis     Pancreatitis      Past Surgical History:   Procedure Laterality Date    ANKLE SURGERY      ELBOW SURGERY      FINGER SURGERY      HAND SURGERY      KNEE SURGERY      SHOULDER SURGERY       No family history on file.  Social History     Tobacco Use    Smoking status: Former Smoker   Substance Use Topics    Alcohol use: Yes     Comment: daily    Drug use: Not Currently     Review of Systems   Constitutional: Negative for fever.   HENT: Negative for congestion and sore throat.    Respiratory: Negative for shortness of breath.    Cardiovascular: Negative for chest pain.   Gastrointestinal: Negative for nausea.   Genitourinary: Negative for dysuria.   Musculoskeletal: Negative for back pain.   Skin: Negative for rash.   Neurological: Negative for weakness, light-headedness and headaches.   Hematological: Does not bruise/bleed easily.   Psychiatric/Behavioral: Negative for agitation and behavioral problems.       Physical Exam     Initial Vitals [01/04/20 0118]   BP Pulse Resp Temp SpO2   (!) 176/102 84 18 97.9 °F (36.6 °C) 95 %      MAP       --         Physical Exam    Nursing note and vitals reviewed.  Constitutional: He appears well-developed and well-nourished. He  is not diaphoretic. No distress.   HENT:   Head: Normocephalic and atraumatic.   Right Ear: External ear normal.   Left Ear: External ear normal.   Nose: Nose normal.   Mouth/Throat: Oropharynx is clear and moist. No oropharyngeal exudate.   Eyes: EOM are normal.   Neck: Normal range of motion. Neck supple. No tracheal deviation present.   Cardiovascular: Normal rate and regular rhythm.   No murmur heard.  Pulmonary/Chest: Breath sounds normal. No stridor. No respiratory distress. He has no rales.   Abdominal: Soft. He exhibits distension. He exhibits no mass. There is no tenderness. There is no rebound.   The patient does not have tense ascites there are no clinical signs of peritonitis, rectal exam patient has some small external hemorrhoids the rectal stool is dark black but not soft it is heme positive   Musculoskeletal: Normal range of motion. He exhibits no edema.   Lymphadenopathy:     He has no cervical adenopathy.   Neurological: He is alert and oriented to person, place, and time. He has normal strength.   Skin: Skin is warm and dry. Capillary refill takes less than 2 seconds. No pallor.   Psychiatric: He has a normal mood and affect.         ED Course   Procedures  Labs Reviewed   CBC W/ AUTO DIFFERENTIAL - Abnormal; Notable for the following components:       Result Value    RBC 2.32 (*)     Hemoglobin 7.8 (*)     Hematocrit 22.9 (*)     Mean Corpuscular Volume 99 (*)     Mean Corpuscular Hemoglobin 33.6 (*)     Eos # 0.6 (*)     Eosinophil% 9.1 (*)     All other components within normal limits   COMPREHENSIVE METABOLIC PANEL - Abnormal; Notable for the following components:    Chloride 112 (*)     CO2 18 (*)     Glucose 115 (*)     BUN, Bld 39 (*)     Creatinine 2.4 (*)     Calcium 8.6 (*)     Albumin 2.7 (*)     eGFR if  35.3 (*)     eGFR if non  30.5 (*)     All other components within normal limits   PROTIME-INR - Abnormal; Notable for the following components:     Prothrombin Time 13.3 (*)     All other components within normal limits   CBC W/ AUTO DIFFERENTIAL - Abnormal; Notable for the following components:    RBC 2.11 (*)     Hemoglobin 7.1 (*)     Hematocrit 21.1 (*)     Mean Corpuscular Volume 100 (*)     Mean Corpuscular Hemoglobin 33.6 (*)     Platelets 146 (*)     Lymph # 0.9 (*)     Gran% 73.9 (*)     Lymph% 15.9 (*)     All other components within normal limits   OCCULT BLOOD X 1, STOOL - Abnormal; Notable for the following components:    Occult Blood Positive (*)     All other components within normal limits   LIPASE   AMMONIA   ALCOHOL,MEDICAL (ETHANOL)   ALCOHOL,MEDICAL (ETHANOL)   URINALYSIS, REFLEX TO URINE CULTURE   DRUG SCREEN PANEL, URINE EMERGENCY   TYPE & SCREEN   PREPARE RBC SOFT   PREPARE RBC SOFT          Imaging Results          CT Head Without Contrast (In process)                  Medical Decision Making:   Currently be do not have General surgery available at the hospital, this patient will need hospitalization blood transfusion and consultation with general surgery and possibly GI, currently he does not seem to require ICU level care as he has been here in the ED for over 4 hr with no sign of new bleeding                           The evaluation, management and treatment of this patient involved Critical Care services  amounting to 75 minutes of direct involvement.  This time was exclusive of any billable procedures.      Clinical Impression:       ICD-10-CM ICD-9-CM   1. Upper GI bleed K92.2 578.9   2. Cirrhosis of liver with ascites, unspecified hepatic cirrhosis type K74.60 571.5    R18.8                              Federico Carson MD  01/04/20 2051

## 2020-01-04 NOTE — TRANSFER OF CARE
Anesthesia Transfer of Care Note    Patient: Gumaro Caruso    Procedure(s) Performed: Procedure(s) (LRB):  EGD (ESOPHAGOGASTRODUODENOSCOPY) (N/A)    Patient location: GI    Anesthesia Type: general    Transport from OR: Transported from OR on room air with adequate spontaneous ventilation    Post pain: adequate analgesia    Post assessment: no apparent anesthetic complications and tolerated procedure well    Post vital signs: stable    Level of consciousness: awake, alert and oriented    Nausea/Vomiting: no nausea/vomiting    Complications: none    Transfer of care protocol was followed      Last vitals:   Visit Vitals  BP (!) 188/96   Pulse 77   Temp 36.9 °C (98.4 °F) (Skin)   Resp 18   Ht 6' (1.829 m)   Wt 93 kg (205 lb)   SpO2 100%   BMI 27.80 kg/m²

## 2020-01-04 NOTE — CONSULTS
"[facility]     CC: Hematemesis    HPI 49 y.o. male with hematemesis, onset yesterday, occurring x 4-5 episodes, associated with abdominal pain, nausea and black stool over the same period, with no alleviating/exacerbating factors.  He states that he has [place] an [place] out of state in the past and had an ulcer.  He also states that he has had a colonoscopy within the last 2 years or so and had one small polyp.  He has been a heavy drinker for many years and states that he quit in October.  He has a known history of cirrhosis secondary to EtOH.  He states that he was lifting something heavy at work a few days ago and felt a "pop" on his right side.  He states that he developed a subrapubic swelling after that which he states subsequently "moved down" into his groin.  He states that it is painful and hurts when he moves, sits up, or touches the area.  He denies chronic diarrhea but does state that his stools have been black.  He admits to NSAID use.    Past Medical History:   Diagnosis Date    Cirrhosis     Pancreatitis        Past Surgical History:   Procedure Laterality Date    ANKLE SURGERY      ELBOW SURGERY      FINGER SURGERY      HAND SURGERY      KNEE SURGERY      SHOULDER SURGERY         Social History     Tobacco Use    Smoking status: Former Smoker   Substance Use Topics    Alcohol use: Yes     Comment: daily    Drug use: Not Currently       History reviewed. No pertinent family history.    Review of Systems  General ROS: negative for - chills, fever or weight loss  Psychological ROS: negative for - hallucination, depression or suicidal ideation  Ophthalmic ROS: negative for - blurry vision, photophobia or eye pain  ENT ROS: negative for - epistaxis, sore throat or rhinorrhea  Respiratory ROS: no cough, shortness of breath, or wheezing  Cardiovascular ROS: no chest pain or dyspnea on exertion  Gastrointestinal ROS: + abdominal and groin pain, + hematemesis, + melena  Genito-Urinary ROS: " no dysuria, trouble voiding, or hematuria  Musculoskeletal ROS: negative for - arthralgia, myalgia, weakness  Neurological ROS: no syncope or seizures; no ataxia  Dermatological ROS: negative for pruritis, rash and jaundice    Physical Examination  Ht 6' (1.829 m)   Wt 93 kg (205 lb 0.4 oz)   BMI 27.81 kg/m²   General appearance: alert, cooperative, no distress but ill appearing  HENT: Normocephalic, atraumatic, neck symmetrical, no nasal discharge   Eyes: conjunctivae/corneas clear, PERRL, EOM's intact, sclera anicteric  Lungs: clear to auscultation bilaterally, no dullness to percussion bilaterally, symmetric expansion, breathing unlabored  Heart: regular rate and rhythm without rub; no displacement of the PMI   Abdomen: protuberant but not tense with mild diffuse TTP and + BS  + large swelling in right groin extending down to the scrotum which is soft and not discolored but quite tender to palpation and is concerning for inguinal hernia with ? incarceration  Extremities: extremities symmetric; no clubbing, cyanosis, or edema  Integument: Skin texture, turgor normal; no rashes; hair distrubution normal, + jaundice/bronze discoloration of the skin with extensive tattooing noted  Neurologic: Alert and oriented X 3, no focal sensory or motor neurologic deficits  Psychiatric: no pressured speech; normal affect; no evidence of impaired cognition, no anxiety/depression     Labs:  Lab Results   Component Value Date    WBC 5.41 01/04/2020    HGB 7.1 (L) 01/04/2020    HCT 21.1 (L) 01/04/2020     (H) 01/04/2020     (L) 01/04/2020         CMP  Sodium   Date Value Ref Range Status   01/04/2020 138 136 - 145 mmol/L Final     Potassium   Date Value Ref Range Status   01/04/2020 4.4 3.5 - 5.1 mmol/L Final     Chloride   Date Value Ref Range Status   01/04/2020 112 (H) 95 - 110 mmol/L Final     CO2   Date Value Ref Range Status   01/04/2020 18 (L) 23 - 29 mmol/L Final     Glucose   Date Value Ref Range Status    01/04/2020 115 (H) 70 - 110 mg/dL Final     BUN, Bld   Date Value Ref Range Status   01/04/2020 39 (H) 6 - 20 mg/dL Final     Creatinine   Date Value Ref Range Status   01/04/2020 2.4 (H) 0.5 - 1.4 mg/dL Final     Calcium   Date Value Ref Range Status   01/04/2020 8.6 (L) 8.7 - 10.5 mg/dL Final     Total Protein   Date Value Ref Range Status   01/04/2020 6.5 6.0 - 8.4 g/dL Final     Albumin   Date Value Ref Range Status   01/04/2020 2.7 (L) 3.5 - 5.2 g/dL Final     Total Bilirubin   Date Value Ref Range Status   01/04/2020 0.9 0.1 - 1.0 mg/dL Final     Comment:     For infants and newborns, interpretation of results should be based  on gestational age, weight and in agreement with clinical  observations.  Premature Infant recommended reference ranges:  Up to 24 hours.............<8.0 mg/dL  Up to 48 hours............<12.0 mg/dL  3-5 days..................<15.0 mg/dL  6-29 days.................<15.0 mg/dL       Alkaline Phosphatase   Date Value Ref Range Status   01/04/2020 115 55 - 135 U/L Final     AST   Date Value Ref Range Status   01/04/2020 26 10 - 40 U/L Final     ALT   Date Value Ref Range Status   01/04/2020 14 10 - 44 U/L Final     Anion Gap   Date Value Ref Range Status   01/04/2020 8 8 - 16 mmol/L Final     eGFR if    Date Value Ref Range Status   01/04/2020 35.3 (A) >60 mL/min/1.73 m^2 Final     eGFR if non    Date Value Ref Range Status   01/04/2020 30.5 (A) >60 mL/min/1.73 m^2 Final     Comment:     Calculation used to obtain the estimated glomerular filtration  rate (eGFR) is the CKD-EPI equation.          Lab Results   Component Value Date    INR 1.2 01/04/2020         Imaging:  CT scan was independently visualized and reviewed by me and showed no acute process.    I have personally reviewed these images    Case discussed with nursing who relates no further bleeding since admission.      Assessment:   1.  Cirrhosis  2.  Hematemesis  3.  Melena  4.  EtOH abuse  5.   Inguinal hernia - ? Incarceration  6.  Anemia    Plan:  1.  NPO  2.  IV PPI  3.  Agree with transfusion PRN and fluid resuscitation  4.  Urgent EGD  5.  Recommend ultrasound and AFP for HCC screening.  6.  Check iron studies.  7.  Further recommendations to follow after above.  8.  Communication will be sent to the referring provider, Dr. Matthews regarding my assessment and plan on this patient via EPIC.      Gigi Lloyd MD  [facility]  [address], [address]  [place] [address]  Office: [phone]  Fax: [phone]

## 2020-01-04 NOTE — ANESTHESIA POSTPROCEDURE EVALUATION
Anesthesia Post Evaluation    Patient: Gumaro Caruso    Procedure(s) Performed: Procedure(s) (LRB):  EGD (ESOPHAGOGASTRODUODENOSCOPY) (N/A)    Final Anesthesia Type: general    Patient location during evaluation: PACU  Patient participation: Yes- Able to Participate  Level of consciousness: sedated and awake  Post-procedure vital signs: reviewed and stable  Pain management: adequate  Airway patency: patent    PONV status at discharge: No PONV  Anesthetic complications: no    Annette-operative Events Comments: Laying on left arm - BP cuff.  Cardiovascular status: hypertensive and blood pressure returned to baseline  Respiratory status: spontaneous ventilation  Hydration status: euvolemic  Follow-up not needed.          Vitals Value Taken Time   /115 1/4/2020 11:40 AM   Temp 36.9 °C (98.4 °F) 1/4/2020 11:02 AM   Pulse 68 1/4/2020 11:40 AM   Resp 18 1/4/2020 11:40 AM   SpO2 100 % 1/4/2020 11:40 AM         No case tracking events are documented in the log.      Pain/Paxton Score: Pain Rating Prior to Med Admin: 9 (1/4/2020  3:25 AM)  Paxton Score: 6 (1/4/2020 11:40 AM)

## 2020-01-04 NOTE — ED NOTES
[facility] calls to speak to Dr. Carson, patient accepted to [facility] to Dr. Jean-Claude Pineda. Waiting for bed assignment.

## 2020-01-04 NOTE — HPI
49 y.o. male with a h/o liver cirrhosis. Chronic NSAID use, chronic heavy alchol use, quit last year as per him who presented to an OSH for  hematemesis, onset yesterday, occurring x 4-5 episodes, associated with abdominal pain, nausea and black stool over the same period, with no alleviating/exacerbating factors.    He states that he has had an E[place] out of state in the past and had an ulcer. He has been a heavy drinker for many years and states that he quit in October.  He has a known history of cirrhosis secondary to EtOH.  He also has been endorsing worsening swelling in his abdomen in the last week. Is suppose to be on medications , about 6 of them but does not take any of the medications he is prescribed due to insurance reasons.   Patient was transferred over for urgent evaluation of GI bleed and underwent an EGD which showed Nonbleeding varices with no stigmata of bleeding noted.  Severe portal hypertensive gastropathy with likely chronic oozing noted, however he also has multiple duodenal and gastric ulcers.  One of the duodenal ulcers was oozing and required endoscopic therapy.  Post procedure he was continued on IV PPI and admitted to the hosopitalist service

## 2020-01-05 PROBLEM — G43.009 MIGRAINE WITHOUT AURA, NOT INTRACTABLE: Status: ACTIVE | Noted: 2020-01-05

## 2020-01-05 LAB
AFP SERPL-MCNC: 2.4 NG/ML (ref 0–8.4)
ALBUMIN SERPL BCP-MCNC: 2.3 G/DL (ref 3.5–5.2)
ALP SERPL-CCNC: 121 U/L (ref 55–135)
ALT SERPL W/O P-5'-P-CCNC: 13 U/L (ref 10–44)
ANION GAP SERPL CALC-SCNC: 6 MMOL/L (ref 8–16)
AST SERPL-CCNC: 21 U/L (ref 10–40)
BASOPHILS # BLD AUTO: 0.06 K/UL (ref 0–0.2)
BASOPHILS # BLD AUTO: 0.07 K/UL (ref 0–0.2)
BASOPHILS NFR BLD: 1.1 % (ref 0–1.9)
BASOPHILS NFR BLD: 1.2 % (ref 0–1.9)
BILIRUB SERPL-MCNC: 1.1 MG/DL (ref 0.1–1)
BUN SERPL-MCNC: 32 MG/DL (ref 6–20)
CALCIUM SERPL-MCNC: 8.1 MG/DL (ref 8.7–10.5)
CHLORIDE SERPL-SCNC: 114 MMOL/L (ref 95–110)
CO2 SERPL-SCNC: 18 MMOL/L (ref 23–29)
CREAT SERPL-MCNC: 2.6 MG/DL (ref 0.5–1.4)
DIFFERENTIAL METHOD: ABNORMAL
DIFFERENTIAL METHOD: ABNORMAL
EOSINOPHIL # BLD AUTO: 0.5 K/UL (ref 0–0.5)
EOSINOPHIL # BLD AUTO: 0.6 K/UL (ref 0–0.5)
EOSINOPHIL NFR BLD: 8.9 % (ref 0–8)
EOSINOPHIL NFR BLD: 9.6 % (ref 0–8)
ERYTHROCYTE [DISTWIDTH] IN BLOOD BY AUTOMATED COUNT: 15.2 % (ref 11.5–14.5)
ERYTHROCYTE [DISTWIDTH] IN BLOOD BY AUTOMATED COUNT: 15.3 % (ref 11.5–14.5)
EST. GFR  (AFRICAN AMERICAN): 32 ML/MIN/1.73 M^2
EST. GFR  (NON AFRICAN AMERICAN): 28 ML/MIN/1.73 M^2
GLUCOSE SERPL-MCNC: 88 MG/DL (ref 70–110)
HCT VFR BLD AUTO: 25.1 % (ref 40–54)
HCT VFR BLD AUTO: 26.6 % (ref 40–54)
HGB BLD-MCNC: 8.5 G/DL (ref 14–18)
HGB BLD-MCNC: 8.8 G/DL (ref 14–18)
IMM GRANULOCYTES # BLD AUTO: 0.01 K/UL (ref 0–0.04)
IMM GRANULOCYTES # BLD AUTO: 0.02 K/UL (ref 0–0.04)
LYMPHOCYTES # BLD AUTO: 1.6 K/UL (ref 1–4.8)
LYMPHOCYTES # BLD AUTO: 1.6 K/UL (ref 1–4.8)
LYMPHOCYTES NFR BLD: 27.2 % (ref 18–48)
LYMPHOCYTES NFR BLD: 28 % (ref 18–48)
MCH RBC QN AUTO: 33 PG (ref 27–31)
MCH RBC QN AUTO: 33.5 PG (ref 27–31)
MCHC RBC AUTO-ENTMCNC: 33.1 G/DL (ref 32–36)
MCHC RBC AUTO-ENTMCNC: 33.9 G/DL (ref 32–36)
MCV RBC AUTO: 100 FL (ref 82–98)
MCV RBC AUTO: 99 FL (ref 82–98)
MONOCYTES # BLD AUTO: 0.5 K/UL (ref 0.3–1)
MONOCYTES # BLD AUTO: 0.6 K/UL (ref 0.3–1)
MONOCYTES NFR BLD: 8.7 % (ref 4–15)
MONOCYTES NFR BLD: 9.8 % (ref 4–15)
NEUTROPHILS # BLD AUTO: 3 K/UL (ref 1.8–7.7)
NEUTROPHILS # BLD AUTO: 3.1 K/UL (ref 1.8–7.7)
NEUTROPHILS NFR BLD: 52 % (ref 38–73)
NEUTROPHILS NFR BLD: 52.9 % (ref 38–73)
NRBC BLD-RTO: 0 /100 WBC
NRBC BLD-RTO: 0 /100 WBC
PLATELET # BLD AUTO: 151 K/UL (ref 150–350)
PLATELET # BLD AUTO: 153 K/UL (ref 150–350)
PMV BLD AUTO: 9.2 FL (ref 9.2–12.9)
PMV BLD AUTO: 9.3 FL (ref 9.2–12.9)
POTASSIUM SERPL-SCNC: 4.7 MMOL/L (ref 3.5–5.1)
PROT SERPL-MCNC: 5.9 G/DL (ref 6–8.4)
RBC # BLD AUTO: 2.54 M/UL (ref 4.6–6.2)
RBC # BLD AUTO: 2.67 M/UL (ref 4.6–6.2)
SODIUM SERPL-SCNC: 138 MMOL/L (ref 136–145)
WBC # BLD AUTO: 5.61 K/UL (ref 3.9–12.7)
WBC # BLD AUTO: 5.91 K/UL (ref 3.9–12.7)

## 2020-01-05 PROCEDURE — 63600175 PHARM REV CODE 636 W HCPCS: Performed by: NURSE PRACTITIONER

## 2020-01-05 PROCEDURE — 36415 COLL VENOUS BLD VENIPUNCTURE: CPT

## 2020-01-05 PROCEDURE — 99232 PR SUBSEQUENT HOSPITAL CARE,LEVL II: ICD-10-PCS | Mod: ,,, | Performed by: INTERNAL MEDICINE

## 2020-01-05 PROCEDURE — C9113 INJ PANTOPRAZOLE SODIUM, VIA: HCPCS | Performed by: NURSE PRACTITIONER

## 2020-01-05 PROCEDURE — 85025 COMPLETE CBC W/AUTO DIFF WBC: CPT | Mod: 91

## 2020-01-05 PROCEDURE — 99232 SBSQ HOSP IP/OBS MODERATE 35: CPT | Mod: ,,, | Performed by: INTERNAL MEDICINE

## 2020-01-05 PROCEDURE — 11000001 HC ACUTE MED/SURG PRIVATE ROOM

## 2020-01-05 PROCEDURE — 25000003 PHARM REV CODE 250: Performed by: INTERNAL MEDICINE

## 2020-01-05 PROCEDURE — 63600175 PHARM REV CODE 636 W HCPCS: Performed by: INTERNAL MEDICINE

## 2020-01-05 PROCEDURE — 80053 COMPREHEN METABOLIC PANEL: CPT

## 2020-01-05 RX ORDER — HYDROCODONE BITARTRATE AND ACETAMINOPHEN 5; 325 MG/1; MG/1
1 TABLET ORAL ONCE
Status: COMPLETED | OUTPATIENT
Start: 2020-01-05 | End: 2020-01-05

## 2020-01-05 RX ORDER — OXYCODONE AND ACETAMINOPHEN 10; 325 MG/1; MG/1
1 TABLET ORAL ONCE
Status: COMPLETED | OUTPATIENT
Start: 2020-01-05 | End: 2020-01-05

## 2020-01-05 RX ORDER — MORPHINE SULFATE 2 MG/ML
2 INJECTION, SOLUTION INTRAMUSCULAR; INTRAVENOUS ONCE
Status: COMPLETED | OUTPATIENT
Start: 2020-01-05 | End: 2020-01-05

## 2020-01-05 RX ORDER — TRAMADOL HYDROCHLORIDE 50 MG/1
50 TABLET ORAL ONCE
Status: COMPLETED | OUTPATIENT
Start: 2020-01-05 | End: 2020-01-05

## 2020-01-05 RX ADMIN — HYDROCODONE BITARTRATE AND ACETAMINOPHEN 1 TABLET: 5; 325 TABLET ORAL at 08:01

## 2020-01-05 RX ADMIN — MORPHINE SULFATE 2 MG: 2 INJECTION, SOLUTION INTRAMUSCULAR; INTRAVENOUS at 11:01

## 2020-01-05 RX ADMIN — SODIUM CHLORIDE: 0.9 INJECTION, SOLUTION INTRAVENOUS at 12:01

## 2020-01-05 RX ADMIN — HYDRALAZINE HYDROCHLORIDE 20 MG: 20 INJECTION INTRAMUSCULAR; INTRAVENOUS at 05:01

## 2020-01-05 RX ADMIN — OXYCODONE AND ACETAMINOPHEN 1 TABLET: 10; 325 TABLET ORAL at 09:01

## 2020-01-05 RX ADMIN — SUCRALFATE 1 G: 1 SUSPENSION ORAL at 01:01

## 2020-01-05 RX ADMIN — CEFTRIAXONE 1 G: 1 INJECTION, SOLUTION INTRAVENOUS at 11:01

## 2020-01-05 RX ADMIN — DEXTROSE 8 MG/HR: 50 INJECTION, SOLUTION INTRAVENOUS at 12:01

## 2020-01-05 RX ADMIN — DEXTROSE 8 MG/HR: 50 INJECTION, SOLUTION INTRAVENOUS at 05:01

## 2020-01-05 RX ADMIN — SUCRALFATE 1 G: 1 SUSPENSION ORAL at 11:01

## 2020-01-05 RX ADMIN — SUCRALFATE 1 G: 1 SUSPENSION ORAL at 06:01

## 2020-01-05 RX ADMIN — SUCRALFATE 1 G: 1 SUSPENSION ORAL at 05:01

## 2020-01-05 RX ADMIN — TRAMADOL HYDROCHLORIDE 50 MG: 50 TABLET, FILM COATED ORAL at 01:01

## 2020-01-05 NOTE — PROGRESS NOTES
[facility] Hospital Medicine  Progress Note    Patient Name: Gumaro Caruso  MRN: [id number]  Patient Class: IP- Inpatient   Admission Date: 1/4/2020  Length of Stay: 1 days  Attending Physician: Bentley Palma MD  Primary Care Provider: Primary Doctor No        Subjective:     Principal Problem:<principal problem not specified>        HPI:  49 y.o. male with  a h/o liver cirrhosis. Chronic NSAID use, chronic heavy alchol use, quit last year as per him who presented to an OSH for  hematemesis, onset yesterday, occurring x 4-5 episodes, associated with abdominal pain, nausea and black stool over the same period, with no alleviating/exacerbating factors.    He states that he has had an EGD out of state in the past and had an ulcer. He has been a heavy drinker for many years and states that he quit in October.  He has a known history of cirrhosis secondary to EtOH.   He also has been endorsing worsening swelling in his abdomen in the last week. Is suppose to be on medications , about 6 of them but does not take any of the medications he is prescribed due to insurance reasons.   Patient was transferred over for urgent evaluation of GI bleed and underwent an EGD which showed Nonbleeding varices with no stigmata of bleeding noted.  Severe portal hypertensive gastropathy with likely chronic oozing noted, however he also has multiple duodenal and gastric ulcers.  One of the duodenal ulcers was oozing and required endoscopic therapy.   Post procedure he was continued on IV PPI and admitted to the hosopitalist service     Overview/Hospital Course:  49 y.o. male with a h/o liver cirrhosis. Chronic NSAID use, chronic heavy alchol use, quit last year as per him who presented to an OSH for  hematemesis, onset yesterday, occurring x 4-5 episodes, associated with abdominal pain, nausea and black stool over the same period, with no alleviating/exacerbating factors.    He states that he has had an EGD out of state  in the past and had an ulcer. He has been a heavy drinker for many years and states that he quit in October.  He has a known history of cirrhosis secondary to EtOH.  He also has been endorsing worsening swelling in his abdomen in the last week. Is suppose to be on medications , about 6 of them but does not take any of the medications he is prescribed due to insurance reasons.   Patient was transferred over for urgent evaluation of GI bleed and underwent an EGD which showed Nonbleeding varices with no stigmata of bleeding noted.  Severe portal hypertensive gastropathy with likely chronic oozing noted, however he also has multiple duodenal and gastric ulcers.  One of the duodenal ulcers was oozing and required endoscopic therapy.  Post procedure he was continued on IV PPI and admitted to the hosopitalist service     Interval History: Patient complaining of migraine this am. No bleeding symptoms    Review of Systems   Constitutional: Positive for activity change. Negative for appetite change, chills, diaphoresis and fatigue.   HENT: Negative.    Eyes: Negative.    Respiratory: Negative.    Cardiovascular: Negative.    Gastrointestinal: Positive for abdominal distention, abdominal pain, blood in stool and nausea. Negative for constipation, diarrhea and rectal pain.        Hematemesis   Endocrine: Negative.    Genitourinary: Negative.    Musculoskeletal: Negative.    Skin: Negative.    Allergic/Immunologic: Negative.    Neurological: Negative.    Hematological: Negative.    Psychiatric/Behavioral: Negative.   Objective:     Vital Signs (Most Recent):  Temp: 96.7 °F (35.9 °C) (01/05/20 1202)  Pulse: 65 (01/05/20 1202)  Resp: 18 (01/05/20 1202)  BP: (!) 184/96 (01/05/20 1202)  SpO2: 98 % (01/05/20 1202) Vital Signs (24h Range):  Temp:  [96.7 °F (35.9 °C)-99.4 °F (37.4 °C)] 96.7 °F (35.9 °C)  Pulse:  [65-80] 65  Resp:  [16-18] 18  SpO2:  [95 %-99 %] 98 %  BP: (162-195)/() 184/96     Weight: 93 kg (205 lb)  Body mass  index is 27.8 kg/m².  No intake or output data in the 24 hours ending 01/05/20 1211   Physical Exam  Constitutional: He is oriented to person, place, and time. He appears well-developed.   HENT:   Head: Normocephalic.   Eyes: Pupils are equal, round, and reactive to light.   Neck: Normal range of motion.   Cardiovascular: Normal rate and regular rhythm.   Pulmonary/Chest: Effort normal and breath sounds normal.   Abdominal: He exhibits distension. He exhibits no mass. There is no tenderness. There is no rebound and no guarding. A hernia is present.   Musculoskeletal: Normal range of motion.   Neurological: He is alert and oriented to person, place, and time. He displays normal reflexes. No cranial nerve deficit. He exhibits normal muscle tone. Coordination normal.   Skin: Skin is warm and dry. Capillary refill takes less than 2 seconds.         Significant Labs: All pertinent labs within the past 24 hours have been reviewed.    Significant Imaging: I have reviewed all pertinent imaging results/findings within the past 24 hours.      Assessment/Plan:      Migraine without aura, not intractable  Pain control   Avoid NSAIDs and tylenol         Essential hypertension  Prn hydralazine to maintain BP < 160/100     Alcoholic cirrhosis of liver with ascites  In the setting of chronic alcohol use and NSAID use   Abdominal ultrasound   No signs of hepatic encephalopathy on exam. Not complaint with medications at home.   Abdomen has fluid but not tense. Will consider paracentesis if inhouse on Monday.    Non-recurrent unilateral inguinal hernia without obstruction or gangrene  Surgery consulted. Appreciate recs . Plan for outpatient wok up      GIB (gastrointestinal bleeding)  S/p urgent EGD which showed Nonbleeding varices with no stigmata of bleeding noted.  Severe portal hypertensive gastropathy with likely chronic oozing noted, however he also has multiple duodenal and gastric ulcers.  One of the duodenal ulcers was oozing  and required endoscopic therapy.      GI following patient. Appreciate recs  Continue protonix drip for now as per recs   Sucralfate added per recs.   H&H monitoring with transfusion as needed   Advance diet  2 large bore IV access   Abdominal ultrasound and AFP per GI       VTE Risk Mitigation (From admission, onward)         Ordered     IP VTE LOW RISK PATIENT  Once      01/04/20 1016     Place sequential compression device  Until discontinued      01/04/20 1016                      Bentley Palma MD  Department of Hospital Medicine   [facility]

## 2020-01-05 NOTE — ASSESSMENT & PLAN NOTE
S/p urgent EGD which showed Nonbleeding varices with no stigmata of bleeding noted.  Severe portal hypertensive gastropathy with likely chronic oozing noted, however he also has multiple duodenal and gastric ulcers.  One of the duodenal ulcers was oozing and required endoscopic therapy.      GI following patient. Appreciate recs  Continue protonix drip for now as per recs   Sucralfate added per recs.   H&H monitoring with transfusion as needed   Advance diet  2 large bore IV access   Abdominal ultrasound and AFP per GI

## 2020-01-05 NOTE — SUBJECTIVE & OBJECTIVE
Interval History: Patient complaining of migraine this am. No bleeding symptoms    Review of Systems   Constitutional: Positive for activity change. Negative for appetite change, chills, diaphoresis and fatigue.   HENT: Negative.    Eyes: Negative.    Respiratory: Negative.    Cardiovascular: Negative.    Gastrointestinal: Positive for abdominal distention, abdominal pain, blood in stool and nausea. Negative for constipation, diarrhea and rectal pain.        Hematemesis   Endocrine: Negative.    Genitourinary: Negative.    Musculoskeletal: Negative.    Skin: Negative.    Allergic/Immunologic: Negative.    Neurological: Negative.    Hematological: Negative.    Psychiatric/Behavioral: Negative.   Objective:     Vital Signs (Most Recent):  Temp: 96.7 °F (35.9 °C) (01/05/20 1202)  Pulse: 65 (01/05/20 1202)  Resp: 18 (01/05/20 1202)  BP: (!) 184/96 (01/05/20 1202)  SpO2: 98 % (01/05/20 1202) Vital Signs (24h Range):  Temp:  [96.7 °F (35.9 °C)-99.4 °F (37.4 °C)] 96.7 °F (35.9 °C)  Pulse:  [65-80] 65  Resp:  [16-18] 18  SpO2:  [95 %-99 %] 98 %  BP: (162-195)/() 184/96     Weight: 93 kg (205 lb)  Body mass index is 27.8 kg/m².  No intake or output data in the 24 hours ending 01/05/20 1211   Physical Exam  Constitutional: He is oriented to person, place, and time. He appears well-developed.   HENT:   Head: Normocephalic.   Eyes: Pupils are equal, round, and reactive to light.   Neck: Normal range of motion.   Cardiovascular: Normal rate and regular rhythm.   Pulmonary/Chest: Effort normal and breath sounds normal.   Abdominal: He exhibits distension. He exhibits no mass. There is no tenderness. There is no rebound and no guarding. A hernia is present.   Musculoskeletal: Normal range of motion.   Neurological: He is alert and oriented to person, place, and time. He displays normal reflexes. No cranial nerve deficit. He exhibits normal muscle tone. Coordination normal.   Skin: Skin is warm and dry. Capillary refill takes  less than 2 seconds.         Significant Labs: All pertinent labs within the past 24 hours have been reviewed.    Significant Imaging: I have reviewed all pertinent imaging results/findings within the past 24 hours.

## 2020-01-05 NOTE — PLAN OF CARE
Pt has had no bleeding this shift.  C/o migraine ha, on duty NP notified, PRN medication administered.  Pt free from fall, NAD.  Will continue monitor progress.

## 2020-01-05 NOTE — PROGRESS NOTES
"[facility] Note    CC: Hematemesis    HPI 49 y.o. male with hematemesis, onset yesterday, occurring x 4-5 episodes, associated with abdominal pain, nausea and black stool over the same period, with no alleviating/exacerbating factors.  He states that he has had an EGD out of state in the past and had an ulcer.  He also states that he has had a colonoscopy within the last 2 years or so and had one small polyp.  He has been a heavy drinker for many years and states that he quit in October.  He has a known history of cirrhosis secondary to EtOH.  He states that he was lifting something heavy at work a few days ago and felt a "pop" on his right side.  He states that he developed a subrapubic swelling after that which he states subsequently "moved down" into his groin.  He states that it is painful and hurts when he moves, sits up, or touches the area.  He denies chronic diarrhea but does state that his stools have been black.  He admits to NSAID use.    EGD done.  Nonbleeding varices with no stigmata of bleeding noted.  Severe portal hypertensive gastropathy with likely chronic oozing noted, however he also has multiple duodenal and gastric ulcers.  One of the duodenal ulcers was oozing and required endoscopic therapy.  Recommend IV PPI and oral sucralfate liquid.  Recommend repeat EGD in 8 weeks to check healing and may consider initiation of therapeutic banding at that time.  Check ultrasound and AFP.  Advance diet.  Will follow.    INTERVAL HISTORY:  No further bleeding today.  Patient tolerating PO and still with some nausea and stomach discomfort.  He had ultrasound which showed no focal liver lesion but did show ascites.  He denies further evidence of bleeding.  H/H stable.  No new complaints per nursing.      Past Medical History:   Diagnosis Date    Cirrhosis     Pancreatitis          Review of Systems  General ROS: negative for - chills, fever or weight loss  Cardiovascular ROS: no chest pain or " dyspnea on exertion  Gastrointestinal ROS: + nausea and abdominal pain    Physical Examination  BP (!) 184/96   Pulse 65   Temp 96.7 °F (35.9 °C)   Resp 18   Ht 6' (1.829 m)   Wt 93 kg (205 lb)   SpO2 98%   BMI 27.80 kg/m²   General appearance: alert, cooperative, no distress  HENT: Normocephalic, atraumatic, neck symmetrical, no nasal discharge, sclera anicteric   Lungs: clear to auscultation bilaterally, symmetric chest wall expansion bilaterally  Heart: regular rate and rhythm without rub; no displacement of the PMI   Abdomen: protuberant with mild diffuse TTP, no rebound  Extremities: extremities symmetric; no clubbing, cyanosis, or edema  Neurologic: Alert and oriented X 3, no sensory or motor neurologic deficits      Labs:  Lab Results   Component Value Date    WBC 5.91 01/05/2020    HGB 8.8 (L) 01/05/2020    HCT 26.6 (L) 01/05/2020     (H) 01/05/2020     01/05/2020         CMP  Sodium   Date Value Ref Range Status   01/05/2020 138 136 - 145 mmol/L Final     Potassium   Date Value Ref Range Status   01/05/2020 4.7 3.5 - 5.1 mmol/L Final     Chloride   Date Value Ref Range Status   01/05/2020 114 (H) 95 - 110 mmol/L Final     CO2   Date Value Ref Range Status   01/05/2020 18 (L) 23 - 29 mmol/L Final     Glucose   Date Value Ref Range Status   01/05/2020 88 70 - 110 mg/dL Final     BUN, Bld   Date Value Ref Range Status   01/05/2020 32 (H) 6 - 20 mg/dL Final     Creatinine   Date Value Ref Range Status   01/05/2020 2.6 (H) 0.5 - 1.4 mg/dL Final     Calcium   Date Value Ref Range Status   01/05/2020 8.1 (L) 8.7 - 10.5 mg/dL Final     Total Protein   Date Value Ref Range Status   01/05/2020 5.9 (L) 6.0 - 8.4 g/dL Final     Albumin   Date Value Ref Range Status   01/05/2020 2.3 (L) 3.5 - 5.2 g/dL Final     Total Bilirubin   Date Value Ref Range Status   01/05/2020 1.1 (H) 0.1 - 1.0 mg/dL Final     Comment:     For infants and newborns, interpretation of results should be based  on gestational  age, weight and in agreement with clinical  observations.  Premature Infant recommended reference ranges:  Up to 24 hours.............<8.0 mg/dL  Up to 48 hours............<12.0 mg/dL  3-5 days..................<15.0 mg/dL  6-29 days.................<15.0 mg/dL       Alkaline Phosphatase   Date Value Ref Range Status   01/05/2020 121 55 - 135 U/L Final     AST   Date Value Ref Range Status   01/05/2020 21 10 - 40 U/L Final     ALT   Date Value Ref Range Status   01/05/2020 13 10 - 44 U/L Final     Anion Gap   Date Value Ref Range Status   01/05/2020 6 (L) 8 - 16 mmol/L Final     eGFR if    Date Value Ref Range Status   01/05/2020 32 (A) >60 mL/min/1.73 m^2 Final     eGFR if non    Date Value Ref Range Status   01/05/2020 28 (A) >60 mL/min/1.73 m^2 Final     Comment:     Calculation used to obtain the estimated glomerular filtration  rate (eGFR) is the CKD-EPI equation.            Imaging:  Ultrasound was independently visualized and reviewed by me and showed hepatosplenomegaly and ascites.          Assessment:   1.  Cirrhosis  2.  Hematemesis  3.  Melena  4.  EtOH abuse  5.  Inguinal hernia - ? Incarceration  6.  Anemia  7.  PUD    Plan:  1.  Continue diet  2.  IV PPI to complete 72 hour course, then switch to PO BID  3.  Watch H/H and transfuse PRN  4.  Repeat scope in 8 weeks to check healing  5.  Recommend paracentesis given presence of ascites on imaging.  Patient on coverage with rocephin.  Hold beta blockers for now.  6.  Will follow.    Gigi Lloyd MD  [facility]  [address], [address]  [place] [address]  Office: [phone]  Fax: [phone]

## 2020-01-05 NOTE — ASSESSMENT & PLAN NOTE
In the setting of chronic alcohol use and NSAID use   Abdominal ultrasound   No signs of hepatic encephalopathy on exam. Not complaint with medications at home.   Abdomen has fluid but not tense. Will consider paracentesis if inhouse on Monday.

## 2020-01-06 VITALS
DIASTOLIC BLOOD PRESSURE: 80 MMHG | RESPIRATION RATE: 18 BRPM | HEART RATE: 75 BPM | BODY MASS INDEX: 27.77 KG/M2 | TEMPERATURE: 97 F | HEIGHT: 72 IN | SYSTOLIC BLOOD PRESSURE: 143 MMHG | WEIGHT: 205 LBS | OXYGEN SATURATION: 95 %

## 2020-01-06 LAB
ALBUMIN SERPL BCP-MCNC: 2.3 G/DL (ref 3.5–5.2)
ALP SERPL-CCNC: 113 U/L (ref 55–135)
ALT SERPL W/O P-5'-P-CCNC: 13 U/L (ref 10–44)
ANION GAP SERPL CALC-SCNC: 7 MMOL/L (ref 8–16)
AST SERPL-CCNC: 18 U/L (ref 10–40)
BASOPHILS # BLD AUTO: 0.07 K/UL (ref 0–0.2)
BASOPHILS NFR BLD: 1.1 % (ref 0–1.9)
BILIRUB SERPL-MCNC: 0.9 MG/DL (ref 0.1–1)
BLD PROD TYP BPU: NORMAL
BLOOD UNIT EXPIRATION DATE: NORMAL
BLOOD UNIT TYPE CODE: 5100
BLOOD UNIT TYPE: NORMAL
BUN SERPL-MCNC: 30 MG/DL (ref 6–20)
CALCIUM SERPL-MCNC: 8.3 MG/DL (ref 8.7–10.5)
CHLORIDE SERPL-SCNC: 112 MMOL/L (ref 95–110)
CO2 SERPL-SCNC: 18 MMOL/L (ref 23–29)
CODING SYSTEM: NORMAL
CREAT SERPL-MCNC: 2.5 MG/DL (ref 0.5–1.4)
DIFFERENTIAL METHOD: ABNORMAL
DISPENSE STATUS: NORMAL
EOSINOPHIL # BLD AUTO: 0.5 K/UL (ref 0–0.5)
EOSINOPHIL NFR BLD: 7.6 % (ref 0–8)
ERYTHROCYTE [DISTWIDTH] IN BLOOD BY AUTOMATED COUNT: 15 % (ref 11.5–14.5)
EST. GFR  (AFRICAN AMERICAN): 34 ML/MIN/1.73 M^2
EST. GFR  (NON AFRICAN AMERICAN): 29 ML/MIN/1.73 M^2
GLUCOSE SERPL-MCNC: 84 MG/DL (ref 70–110)
HCT VFR BLD AUTO: 26.9 % (ref 40–54)
HGB BLD-MCNC: 9 G/DL (ref 14–18)
IMM GRANULOCYTES # BLD AUTO: 0.01 K/UL (ref 0–0.04)
LYMPHOCYTES # BLD AUTO: 1.8 K/UL (ref 1–4.8)
LYMPHOCYTES NFR BLD: 27.3 % (ref 18–48)
MCH RBC QN AUTO: 32.8 PG (ref 27–31)
MCHC RBC AUTO-ENTMCNC: 33.5 G/DL (ref 32–36)
MCV RBC AUTO: 98 FL (ref 82–98)
MONOCYTES # BLD AUTO: 0.5 K/UL (ref 0.3–1)
MONOCYTES NFR BLD: 8.2 % (ref 4–15)
NEUTROPHILS # BLD AUTO: 3.7 K/UL (ref 1.8–7.7)
NEUTROPHILS NFR BLD: 55.6 % (ref 38–73)
NRBC BLD-RTO: 0 /100 WBC
NUM UNITS TRANS PACKED RBC: NORMAL
PLATELET # BLD AUTO: 162 K/UL (ref 150–350)
PMV BLD AUTO: 8.9 FL (ref 9.2–12.9)
POTASSIUM SERPL-SCNC: 4.6 MMOL/L (ref 3.5–5.1)
PROT SERPL-MCNC: 6 G/DL (ref 6–8.4)
RBC # BLD AUTO: 2.74 M/UL (ref 4.6–6.2)
SODIUM SERPL-SCNC: 137 MMOL/L (ref 136–145)
WBC # BLD AUTO: 6.56 K/UL (ref 3.9–12.7)

## 2020-01-06 PROCEDURE — 36415 COLL VENOUS BLD VENIPUNCTURE: CPT

## 2020-01-06 PROCEDURE — 80053 COMPREHEN METABOLIC PANEL: CPT

## 2020-01-06 PROCEDURE — 85025 COMPLETE CBC W/AUTO DIFF WBC: CPT

## 2020-01-06 NOTE — NURSING
PT wanting to leave AMA. Dr. Su called who came and spoke to patient. Patient educated on risks of leaving AMA. Pt says he is very displeased with [facility] and not being able to get definite answers. Pt states he was told by night shift his paracentesis would be at 6AM this morning, it is now 9 AM and he cannot get a definite answer on what time he will go down for the procedure. Pt states he and his mother will be writing letters to [facility] about how displeased they were with our care. Pt signed AMA papers and walked self off unit.

## 2020-01-06 NOTE — PROGRESS NOTES
01/05/20 1608   Discharge Assessment   Assessment Type Discharge Planning Assessment       SW was unable to complete a discharge planning assessment at this time. Pt unable to fully participate with interview (pt had just taken strong medications and was unable to fully wake) and no caregivers present at bedside. Pt will continue to be followed for any continuity of care issues, discharge planning, and emotional support. SW remains available.    SW did discuss that pt did not have any insurance on file and pt may receive a bill. Pt reports having [employer] as pt's health insurance and reports that he will ask his brother to bring the information. SW remains available.

## 2020-01-06 NOTE — PLAN OF CARE
Pt NPO at this time, provided teaching about scheduled procedure for today.  Pt expressed understanding.  Remains safe from falls, NAD, expressed relief from ha of two days after one time dose of percocet 10 mg.  Will continue to monitor progress.

## 2020-01-06 NOTE — DISCHARGE SUMMARY
[facility] Medicine  Discharge Summary      Patient Name: Gumaro Caruso  MRN: [id number]  Admission Date: 1/4/2020  Hospital Length of Stay: 2 days  Discharge Date and Time:  01/06/2020 9:09 AM  Attending Physician: Jonathan Su MD   Discharging Provider: Jonathan Su MD  Primary Care Provider: Primary Doctor No      HPI:   49 y.o. male with  a h/o liver cirrhosis. Chronic NSAID use, chronic heavy alchol use, quit last year as per him who presented to an OSH for  hematemesis, onset yesterday, occurring x 4-5 episodes, associated with abdominal pain, nausea and black stool over the same period, with no alleviating/exacerbating factors.    He states that he has had an EGD out of state in the past and had an ulcer. He has been a heavy drinker for many years and states that he quit in October.  He has a known history of cirrhosis secondary to EtOH.   He also has been endorsing worsening swelling in his abdomen in the last week. Is suppose to be on medications , about 6 of them but does not take any of the medications he is prescribed due to insurance reasons.   Patient was transferred over for urgent evaluation of GI bleed and underwent an EGD which showed Nonbleeding varices with no stigmata of bleeding noted.  Severe portal hypertensive gastropathy with likely chronic oozing noted, however he also has multiple duodenal and gastric ulcers.  One of the duodenal ulcers was oozing and required endoscopic therapy.   Post procedure he [facility]     Procedure(s) (LRB):  EGD (ESOPHAGOGASTRODUODENOSCOPY) (N/A)      Hospital Course:   49 y.o. male with a h/o liver cirrhosis. Chronic NSAID use, chronic heavy alchol use, quit last year as per him who presented to an OSH for  hematemesis, onset yesterday, occurring x 4-5 episodes, associated with abdominal pain, nausea and black stool over the same period, with no alleviating/exacerbating factors.     He states that he has had an EGD out of state in the past and had an ulcer. He has been a heavy drinker for many years and states that he quit in October.  He has a known history of cirrhosis secondary to EtOH.  He also has been endorsing worsening swelling in his abdomen in the last week. Is suppose to be on medications , about 6 of them but does not take any of the medications he is prescribed due to insurance reasons.   Patient was transferred over for urgent evaluation of GI bleed and underwent an EGD which showed Nonbleeding varices with no stigmata of bleeding noted.  Severe portal hypertensive gastropathy with likely chronic oozing noted, however he also has multiple duodenal and gastric ulcers.  One of the duodenal ulcers was oozing and required endoscopic therapy.  Post procedure he was continued on IV PPI which was switched to oral Protonix twice daily.  While patient was scheduled for abdominal paracentesis for abdominal ascites, this morning patient decided to leave against medical advice as he could not wait for EGD.  Dangers of leaving against medical advice including worsening in underlying condition, worsening ascites, worsening abdominal pain, peritoneal infection, sepsis and death discussed with the patient.  Patient voiced understanding and signed AMA papers.    Consults:   Consults (From admission, onward)        Status Ordering Provider     Inpatient consult to Gastroenterology  Once     Provider:  Gigi Pickering MD    Completed BRANDT SIN     Inpatient consult to Gastroenterology  Once     Provider:  Gigi Pickering MD    Completed NALINI CHOWDARY     Inpatient consult to General Surgery  Once     Provider:  Elsa Weston MD    Acknowledged NALINI CHOWDARY     Inpatient consult to Social Work/Case Management  Once     Provider:  (Not yet assigned)    Acknowledged BRANDT SIN        EGD:  - Normal upper third of esophagus.                       -  Non-bleeding grade II esophageal varices.                       - Z-line regular, 38 cm from the incisors.                       - Medium-sized hiatal hernia.                       - Portal hypertensive gastropathy.                       - Gastritis.                       - Duodenitis.                       - One oozing duodenal ulcer. Injected. Treated with                        bipolar cautery.                       - Normal second portion of the duodenum.                       - No specimens collected.    Final Active Diagnoses:    Diagnosis Date Noted POA    PRINCIPAL PROBLEM:  GIB (gastrointestinal bleeding) [K92.2] 01/04/2020 Yes    Migraine without aura, not intractable [G43.009] 01/05/2020 Unknown    Non-recurrent unilateral inguinal hernia without obstruction or gangrene [K40.90] 01/04/2020 Yes    Alcoholic cirrhosis of liver with ascites [K70.31] 01/04/2020 Unknown    Essential hypertension [I10] 01/04/2020 Unknown      Problems Resolved During this Admission:       Discharged Condition: against medical advice    Disposition:  left against medical advice    Follow Up:  Left against medical advice    Patient Instructions:   No discharge procedures on file.    Significant Diagnostic Studies: Labs:   CMP   Recent Labs   Lab 01/05/20  0532 01/06/20  0523    137   K 4.7 4.6   * 112*   CO2 18* 18*   GLU 88 84   BUN 32* 30*   CREATININE 2.6* 2.5*   CALCIUM 8.1* 8.3*   PROT 5.9* 6.0   ALBUMIN 2.3* 2.3*   BILITOT 1.1* 0.9   ALKPHOS 121 113   AST 21 18   ALT 13 13   ANIONGAP 6* 7*   ESTGFRAFRICA 32* 34*   EGFRNONAA 28* 29*    and CBC   Recent Labs   Lab 01/05/20  0532 01/05/20  0850 01/06/20  0523   WBC 5.61 5.91 6.56   HGB 8.5* 8.8* 9.0*   HCT 25.1* 26.6* 26.9*    151 162       Pending Diagnostic Studies:     Procedure Component Value Units Date/Time    IR Paracentesis with Imaging [[id number]]     Order Status:  Sent Lab Status:  No result          Medications:  Patient left against medical  advice.    Indwelling Lines/Drains at time of discharge:   Lines/Drains/Airways     None               Time spent on the discharge of patient: 25 minutes  Patient was seen and examined on the date of discharge and determined to be suitable for discharge.         Jonathan Su MD  Department of Hospital Medicine  [facility]

## 2020-01-06 NOTE — PLAN OF CARE
POC reviewed with patient. Patient verbalizes understanding. Safety maintained throughout shift. Patient refused tele. Call light within reach.

## 2020-01-06 NOTE — PROGRESS NOTES
Call received by patient's registered nurse, patient has signed against medical advice papers.  Patient is upset about ongoing NPO status for anticipated abdominal paracentesis planned this morning by Interventional Radiology.  I went to see the patient and discussed the importance of getting abdominal paracentesis done today.  Patient is upset and angry at this moment and does not want to wait.  He states I will lose my job.  He stated his mother will be writing long latter to the hospital for poor since service and patient's satisfaction.  He understands leaving against medical advice puts him at high risk for further complications including gastrointestinal bleeding, abdominal infection, confusion, abdominal pain, sepsis, septic shock and even death.

## 2020-01-06 NOTE — PLAN OF CARE
01/06/20 0939   Final Note   Assessment Type Final Discharge Note   Anticipated Discharge Disposition Left Against

## 2020-01-13 NOTE — PHYSICIAN QUERY
PT Name: Gumaro Caruso  MR #: [id number]     PHYSICIAN QUERY -  ACUITY OF CONDITION CLARIFICATION      CDS/: Courtney Richards RN, CDS               Contact information: [email or web address]                                                                                                                         [phone] ext. [phone]  This form is a permanent document in the medical record.     Query Date: January 13, 2020    By submitting this query, we are merely seeking further clarification of documentation to reflect the severity of illness of your patient. Please utilize your independent clinical judgment when addressing the question(s) below.    The Medical record reflects the following:     Indicators   Supporting Clinical Findings Location in Medical Record   x Documentation of condition One of the duodenal ulcers was oozing  H&P 1/4    Lab Value(s)     x Radiology Findings S/p urgent EGD which showed Nonbleeding varices with no stigmata of bleeding noted.  Severe portal hypertensive gastropathy with likely chronic oozing noted, however he also has multiple duodenal and gastric ulcers.  One of the duodenal ulcers was oozing and required endoscopic therapy.     H&P 1/4   x Treatment                                 Medication Continue protonix drip  Sucralfate added per recs   H&P 1/4   x Other 49 y.o. male with hematemesis, onset yesterday, occurring x 4-5 episodes, associated with abdominal pain, nausea and black stool over the same period, with no alleviating/exacerbating factors.  He states that [place] in the [place] and had an ulcer. GI Consult 1/4     Provider, please specify the acuity/chronicity of __duodenal ulcer___:    [   ] Acute   [  x ] Acute and/on chronic   [   ] Other, please specify _______________   [   ] Clinically Undetermined       Please document in your progress notes daily for the duration of treatment until resolved, and include in your discharge summary.